# Patient Record
Sex: MALE | Race: WHITE | Employment: OTHER | URBAN - METROPOLITAN AREA
[De-identification: names, ages, dates, MRNs, and addresses within clinical notes are randomized per-mention and may not be internally consistent; named-entity substitution may affect disease eponyms.]

---

## 2017-03-16 ENCOUNTER — OFFICE VISIT (OUTPATIENT)
Dept: FAMILY MEDICINE CLINIC | Age: 62
End: 2017-03-16
Payer: COMMERCIAL

## 2017-03-16 VITALS
SYSTOLIC BLOOD PRESSURE: 142 MMHG | DIASTOLIC BLOOD PRESSURE: 86 MMHG | HEART RATE: 80 BPM | BODY MASS INDEX: 35.2 KG/M2 | HEIGHT: 66 IN | WEIGHT: 219 LBS

## 2017-03-16 DIAGNOSIS — Z12.5 SCREENING FOR PROSTATE CANCER: ICD-10-CM

## 2017-03-16 DIAGNOSIS — Z00.00 PHYSICAL EXAM, ANNUAL: Primary | ICD-10-CM

## 2017-03-16 DIAGNOSIS — R07.2 PRECORDIAL PAIN: ICD-10-CM

## 2017-03-16 DIAGNOSIS — M15.9 PRIMARY OSTEOARTHRITIS INVOLVING MULTIPLE JOINTS: ICD-10-CM

## 2017-03-16 DIAGNOSIS — Z13.220 SCREENING CHOLESTEROL LEVEL: ICD-10-CM

## 2017-03-16 DIAGNOSIS — Z12.11 SCREEN FOR COLON CANCER: ICD-10-CM

## 2017-03-16 DIAGNOSIS — I10 ESSENTIAL HYPERTENSION: ICD-10-CM

## 2017-03-16 LAB
HEMOCCULT STL QL: NORMAL

## 2017-03-16 PROCEDURE — 82270 OCCULT BLOOD FECES: CPT | Performed by: FAMILY MEDICINE

## 2017-03-16 PROCEDURE — 99396 PREV VISIT EST AGE 40-64: CPT | Performed by: FAMILY MEDICINE

## 2017-03-16 RX ORDER — LISINOPRIL AND HYDROCHLOROTHIAZIDE 12.5; 1 MG/1; MG/1
TABLET ORAL
COMMUNITY
Start: 2016-12-19 | End: 2017-03-16 | Stop reason: ALTCHOICE

## 2017-03-16 RX ORDER — CLOTRIMAZOLE AND BETAMETHASONE DIPROPIONATE 10; .64 MG/G; MG/G
CREAM TOPICAL
COMMUNITY
Start: 2017-03-15 | End: 2019-10-02 | Stop reason: ALTCHOICE

## 2017-03-16 RX ORDER — LISINOPRIL AND HYDROCHLOROTHIAZIDE 20; 12.5 MG/1; MG/1
1 TABLET ORAL DAILY
Qty: 90 TABLET | Refills: 3 | Status: SHIPPED | OUTPATIENT
Start: 2017-03-16 | End: 2018-04-02 | Stop reason: SDUPTHER

## 2017-03-16 ASSESSMENT — ENCOUNTER SYMPTOMS
CONSTIPATION: 0
HEARTBURN: 0
ABDOMINAL PAIN: 0
EYES NEGATIVE: 1
BLOOD IN STOOL: 0
DIARRHEA: 0
SHORTNESS OF BREATH: 0

## 2017-03-16 ASSESSMENT — PATIENT HEALTH QUESTIONNAIRE - PHQ9
SUM OF ALL RESPONSES TO PHQ9 QUESTIONS 1 & 2: 0
1. LITTLE INTEREST OR PLEASURE IN DOING THINGS: 0
2. FEELING DOWN, DEPRESSED OR HOPELESS: 0
SUM OF ALL RESPONSES TO PHQ QUESTIONS 1-9: 0

## 2017-04-01 ENCOUNTER — HOSPITAL ENCOUNTER (OUTPATIENT)
Age: 62
Discharge: HOME | End: 2017-04-01
Payer: COMMERCIAL

## 2017-04-01 DIAGNOSIS — Z12.5 SCREENING FOR PROSTATE CANCER: ICD-10-CM

## 2017-04-01 DIAGNOSIS — I10 ESSENTIAL HYPERTENSION: ICD-10-CM

## 2017-04-01 DIAGNOSIS — Z13.220 SCREENING CHOLESTEROL LEVEL: ICD-10-CM

## 2017-04-01 LAB
ALBUMIN SERPL-MCNC: 4.1 G/DL (ref 3.5–5.2)
ALBUMIN/GLOBULIN RATIO: 1.6 (ref 1–2.5)
ALP BLD-CCNC: 75 U/L (ref 40–129)
ALT SERPL-CCNC: 24 U/L (ref 5–41)
ANION GAP SERPL CALCULATED.3IONS-SCNC: 15 MMOL/L (ref 9–17)
AST SERPL-CCNC: 21 U/L
BILIRUB SERPL-MCNC: 0.76 MG/DL (ref 0.3–1.2)
BUN BLDV-MCNC: 13 MG/DL (ref 8–23)
BUN/CREAT BLD: ABNORMAL (ref 9–20)
CALCIUM SERPL-MCNC: 8.8 MG/DL (ref 8.6–10.4)
CHLORIDE BLD-SCNC: 103 MMOL/L (ref 98–107)
CHOLESTEROL/HDL RATIO: 5.6
CHOLESTEROL: 167 MG/DL
CO2: 25 MMOL/L (ref 20–31)
CREAT SERPL-MCNC: 0.93 MG/DL (ref 0.7–1.2)
GFR AFRICAN AMERICAN: >60 ML/MIN
GFR NON-AFRICAN AMERICAN: >60 ML/MIN
GFR SERPL CREATININE-BSD FRML MDRD: ABNORMAL ML/MIN/{1.73_M2}
GFR SERPL CREATININE-BSD FRML MDRD: ABNORMAL ML/MIN/{1.73_M2}
GLUCOSE BLD-MCNC: 108 MG/DL (ref 70–99)
HDLC SERPL-MCNC: 30 MG/DL
LDL CHOLESTEROL: 105 MG/DL (ref 0–130)
POTASSIUM SERPL-SCNC: 3.9 MMOL/L (ref 3.7–5.3)
PROSTATE SPECIFIC ANTIGEN: 1.71 UG/L
SODIUM BLD-SCNC: 143 MMOL/L (ref 135–144)
TOTAL PROTEIN: 6.7 G/DL (ref 6.4–8.3)
TRIGL SERPL-MCNC: 162 MG/DL
VLDLC SERPL CALC-MCNC: ABNORMAL MG/DL (ref 1–30)

## 2017-04-01 PROCEDURE — G0103 PSA SCREENING: HCPCS

## 2017-04-01 PROCEDURE — 80053 COMPREHEN METABOLIC PANEL: CPT

## 2017-04-01 PROCEDURE — 80061 LIPID PANEL: CPT

## 2017-04-01 PROCEDURE — 36415 COLL VENOUS BLD VENIPUNCTURE: CPT

## 2017-04-06 ENCOUNTER — HOSPITAL ENCOUNTER (OUTPATIENT)
Dept: NON INVASIVE DIAGNOSTICS | Age: 62
Discharge: HOME | End: 2017-04-06
Payer: COMMERCIAL

## 2017-04-06 VITALS — HEART RATE: 62 BPM | DIASTOLIC BLOOD PRESSURE: 71 MMHG | RESPIRATION RATE: 15 BRPM | SYSTOLIC BLOOD PRESSURE: 108 MMHG

## 2017-04-06 DIAGNOSIS — I10 ESSENTIAL HYPERTENSION: ICD-10-CM

## 2017-04-06 DIAGNOSIS — R07.2 PRECORDIAL PAIN: ICD-10-CM

## 2017-04-06 PROCEDURE — 93017 CV STRESS TEST TRACING ONLY: CPT

## 2017-04-07 ENCOUNTER — OFFICE VISIT (OUTPATIENT)
Dept: FAMILY MEDICINE CLINIC | Age: 62
End: 2017-04-07
Payer: COMMERCIAL

## 2017-04-07 VITALS
WEIGHT: 225 LBS | HEIGHT: 69 IN | HEART RATE: 88 BPM | DIASTOLIC BLOOD PRESSURE: 78 MMHG | SYSTOLIC BLOOD PRESSURE: 140 MMHG | BODY MASS INDEX: 33.33 KG/M2

## 2017-04-07 DIAGNOSIS — R94.31 ABNORMAL ECG DURING EXERCISE STRESS TEST: ICD-10-CM

## 2017-04-07 DIAGNOSIS — R73.09 ABNORMAL GLUCOSE: ICD-10-CM

## 2017-04-07 DIAGNOSIS — R07.2 PRECORDIAL PAIN: Primary | ICD-10-CM

## 2017-04-07 DIAGNOSIS — I10 ESSENTIAL HYPERTENSION: ICD-10-CM

## 2017-04-07 PROCEDURE — 99213 OFFICE O/P EST LOW 20 MIN: CPT | Performed by: FAMILY MEDICINE

## 2017-04-07 RX ORDER — ASPIRIN 81 MG/1
81 TABLET ORAL DAILY
Qty: 30 TABLET | Refills: 3 | COMMUNITY
Start: 2017-04-07 | End: 2019-10-02 | Stop reason: ALTCHOICE

## 2017-04-07 ASSESSMENT — ENCOUNTER SYMPTOMS
ABDOMINAL PAIN: 0
COUGH: 0
SHORTNESS OF BREATH: 0
EYES NEGATIVE: 1

## 2017-10-09 ENCOUNTER — OFFICE VISIT (OUTPATIENT)
Dept: FAMILY MEDICINE CLINIC | Age: 62
End: 2017-10-09
Payer: COMMERCIAL

## 2017-10-09 VITALS
WEIGHT: 214 LBS | HEART RATE: 72 BPM | BODY MASS INDEX: 31.7 KG/M2 | HEIGHT: 69 IN | SYSTOLIC BLOOD PRESSURE: 120 MMHG | DIASTOLIC BLOOD PRESSURE: 78 MMHG

## 2017-10-09 DIAGNOSIS — M15.9 PRIMARY OSTEOARTHRITIS INVOLVING MULTIPLE JOINTS: ICD-10-CM

## 2017-10-09 DIAGNOSIS — I10 ESSENTIAL HYPERTENSION: Primary | ICD-10-CM

## 2017-10-09 DIAGNOSIS — G89.29 CHRONIC PAIN OF BOTH SHOULDERS: ICD-10-CM

## 2017-10-09 DIAGNOSIS — E66.09 NON MORBID OBESITY DUE TO EXCESS CALORIES: ICD-10-CM

## 2017-10-09 DIAGNOSIS — M25.512 CHRONIC PAIN OF BOTH SHOULDERS: ICD-10-CM

## 2017-10-09 DIAGNOSIS — M25.511 CHRONIC PAIN OF BOTH SHOULDERS: ICD-10-CM

## 2017-10-09 PROCEDURE — 99213 OFFICE O/P EST LOW 20 MIN: CPT | Performed by: FAMILY MEDICINE

## 2017-10-09 ASSESSMENT — ENCOUNTER SYMPTOMS
SHORTNESS OF BREATH: 0
COUGH: 0
ABDOMINAL PAIN: 0
EYES NEGATIVE: 1

## 2017-10-09 NOTE — PROGRESS NOTES
Social History     Social History    Marital status:      Spouse name: N/A    Number of children: N/A    Years of education: N/A     Social History Main Topics    Smoking status: Never Smoker    Smokeless tobacco: Never Used    Alcohol use No      Comment: sober since 1987    Drug use: No    Sexual activity: Yes     Partners: Female     Other Topics Concern    None     Social History Narrative    None       SURGICAL HISTORY    Past Surgical History:   Procedure Laterality Date    KNEE ARTHROSCOPY Left        CURRENT MEDICATIONS    Current Outpatient Prescriptions   Medication Sig Dispense Refill    VITAMIN E PO Take by mouth      MAGNESIUM PO Take by mouth      Misc. Devices MISC Patient request tubing, mask and filters appropriate for his CPAP machine 1 each 0    aspirin EC 81 MG EC tablet Take 1 tablet by mouth daily 30 tablet 3    lisinopril-hydrochlorothiazide (PRINZIDE;ZESTORETIC) 20-12.5 MG per tablet Take 1 tablet by mouth daily 90 tablet 3    clotrimazole-betamethasone (LOTRISONE) 1-0.05 % cream        No current facility-administered medications for this visit. ALLERGIES    No Known Allergies    PHYSICAL EXAM   Physical Exam   Constitutional: He is oriented to person, place, and time. He appears well-developed and well-nourished. HENT:   Head: Normocephalic. Mouth/Throat: Oropharynx is clear and moist.   Eyes: Pupils are equal, round, and reactive to light. Neck: Normal range of motion. Neck supple. No thyromegaly present. Cardiovascular: Normal rate, regular rhythm and normal heart sounds. No murmur heard. Pulmonary/Chest: Effort normal and breath sounds normal. He has no wheezes. He has no rales. Abdominal: Soft. There is no tenderness. There is no rebound and no guarding. Musculoskeletal: Normal range of motion. He exhibits tenderness (bilater distal shoulders) and deformity (OA changes). He exhibits no edema.    Crepitus bilat shoulders   Lymphadenopathy: He has no cervical adenopathy. Neurological: He is alert and oriented to person, place, and time. Skin: Skin is warm and dry. Psychiatric: He has a normal mood and affect. His behavior is normal.   Vitals reviewed. Assessment/PLan  1. Essential hypertension  Chronic, stable on med. Cont active lifestyle    2. Primary osteoarthritis involving multiple joints  Chronic. Cont to see ortho. 3. Chronic pain of both shoulders  Chronic. See ortho if wanted. 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 4. Non morbid obesity due to excess calories  Chronic, losing weight with improved diet. Stu Palacio received counseling on the following healthy behaviors: exercise and medication adherence  Reviewed prior labs and health maintenance  Continue current medications, diet and exercise. Discussed use, benefit, and side effects of prescribed medications. Barriers to medication compliance addressed. Patient given educational materials - see patient instructions  Was a self-tracking handout given in paper form or via Needlet? Yes    Requested Prescriptions      No prescriptions requested or ordered in this encounter       All patient questions answered. Patient voiced understanding. Quality Measures    Body mass index is 31.6 kg/m². Elevated. Weight control planned discussed Healthy diet and regular exercise. BP: 120/78 Blood pressure is normal. Treatment plan consists of No treatment change needed. Lab Results   Component Value Date    LDLCHOLESTEROL 105 04/01/2017    (goal LDL reduction with dx if diabetes is 50% LDL reduction)      PHQ Scores 3/16/2017   PHQ2 Score 0   PHQ9 Score 0     Interpretation of Total Score Depression Severity: 1-4 = Minimal depression, 5-9 = Mild depression, 10-14 = Moderate depression, 15-19 = Moderately severe depression, 20-27 = Severe depression     Return in about 6 months (around 4/9/2018) for Return for bp check, Return for labs.         Electronically signed by Gume Rodríguez MD on 10/9/17 at 3:51 PM

## 2018-04-02 ENCOUNTER — OFFICE VISIT (OUTPATIENT)
Dept: FAMILY MEDICINE CLINIC | Age: 63
End: 2018-04-02
Payer: COMMERCIAL

## 2018-04-02 VITALS
SYSTOLIC BLOOD PRESSURE: 138 MMHG | WEIGHT: 223.65 LBS | HEART RATE: 64 BPM | BODY MASS INDEX: 33.03 KG/M2 | DIASTOLIC BLOOD PRESSURE: 78 MMHG

## 2018-04-02 DIAGNOSIS — Z13.220 SCREENING CHOLESTEROL LEVEL: ICD-10-CM

## 2018-04-02 DIAGNOSIS — Z00.00 PHYSICAL EXAM, ANNUAL: Primary | ICD-10-CM

## 2018-04-02 DIAGNOSIS — L30.9 CHRONIC DERMATITIS: ICD-10-CM

## 2018-04-02 DIAGNOSIS — Z01.818 PRE-OPERATIVE CLEARANCE: ICD-10-CM

## 2018-04-02 DIAGNOSIS — I10 ESSENTIAL HYPERTENSION: ICD-10-CM

## 2018-04-02 DIAGNOSIS — Z12.11 SCREEN FOR COLON CANCER: ICD-10-CM

## 2018-04-02 DIAGNOSIS — M15.9 PRIMARY OSTEOARTHRITIS INVOLVING MULTIPLE JOINTS: ICD-10-CM

## 2018-04-02 DIAGNOSIS — Z12.5 SCREENING FOR PROSTATE CANCER: ICD-10-CM

## 2018-04-02 DIAGNOSIS — G47.33 OBSTRUCTIVE SLEEP APNEA SYNDROME: ICD-10-CM

## 2018-04-02 LAB
HEMOCCULT STL QL: NEGATIVE
HEMOCCULT STL QL: NORMAL
HEMOCCULT STL QL: NORMAL

## 2018-04-02 PROCEDURE — 99396 PREV VISIT EST AGE 40-64: CPT | Performed by: FAMILY MEDICINE

## 2018-04-02 PROCEDURE — 82270 OCCULT BLOOD FECES: CPT | Performed by: FAMILY MEDICINE

## 2018-04-02 RX ORDER — LISINOPRIL AND HYDROCHLOROTHIAZIDE 20; 12.5 MG/1; MG/1
1 TABLET ORAL DAILY
Qty: 90 TABLET | Refills: 3 | Status: SHIPPED | OUTPATIENT
Start: 2018-04-02 | End: 2019-09-17 | Stop reason: SDUPTHER

## 2018-04-02 ASSESSMENT — PATIENT HEALTH QUESTIONNAIRE - PHQ9
1. LITTLE INTEREST OR PLEASURE IN DOING THINGS: 0
2. FEELING DOWN, DEPRESSED OR HOPELESS: 0
SUM OF ALL RESPONSES TO PHQ9 QUESTIONS 1 & 2: 0
SUM OF ALL RESPONSES TO PHQ QUESTIONS 1-9: 0

## 2018-04-02 ASSESSMENT — ENCOUNTER SYMPTOMS
DIARRHEA: 0
HEARTBURN: 0
BLOOD IN STOOL: 0
ABDOMINAL PAIN: 0
CONSTIPATION: 0
COUGH: 0
EYES NEGATIVE: 1
SHORTNESS OF BREATH: 0

## 2018-04-03 LAB
ALBUMIN SERPL-MCNC: 3.8 G/DL
ALP BLD-CCNC: 75 U/L
ALT SERPL-CCNC: 24 U/L
ANION GAP SERPL CALCULATED.3IONS-SCNC: 10 MMOL/L
AST SERPL-CCNC: 22 U/L
BILIRUB SERPL-MCNC: 0.5 MG/DL (ref 0.1–1.4)
BUN BLDV-MCNC: 15 MG/DL
CALCIUM SERPL-MCNC: 9 MG/DL
CHLORIDE BLD-SCNC: 107 MMOL/L
CHOLESTEROL, TOTAL: 174 MG/DL
CHOLESTEROL/HDL RATIO: 5.6
CO2: 27 MMOL/L
CREAT SERPL-MCNC: 1.06 MG/DL
GFR CALCULATED: NORMAL
GLUCOSE BLD-MCNC: 107 MG/DL
HDLC SERPL-MCNC: 31 MG/DL (ref 35–70)
LDL CHOLESTEROL CALCULATED: 122 MG/DL (ref 0–160)
POTASSIUM SERPL-SCNC: 3.7 MMOL/L
PROSTATE SPECIFIC ANTIGEN: 1.43 NG/ML
SODIUM BLD-SCNC: 144 MMOL/L
TOTAL PROTEIN: 6.1
TRIGL SERPL-MCNC: 106 MG/DL
VLDLC SERPL CALC-MCNC: 21 MG/DL

## 2018-04-05 DIAGNOSIS — Z13.220 SCREENING CHOLESTEROL LEVEL: ICD-10-CM

## 2018-04-05 DIAGNOSIS — I10 ESSENTIAL HYPERTENSION: ICD-10-CM

## 2018-04-05 DIAGNOSIS — Z12.5 SCREENING FOR PROSTATE CANCER: ICD-10-CM

## 2018-04-06 ENCOUNTER — TELEPHONE (OUTPATIENT)
Dept: FAMILY MEDICINE CLINIC | Age: 63
End: 2018-04-06

## 2018-04-24 ENCOUNTER — HOSPITAL ENCOUNTER (OUTPATIENT)
Facility: CLINIC | Age: 63
Discharge: HOME | End: 2018-04-24
Payer: COMMERCIAL

## 2018-04-24 LAB — FORWARDED TO:: NORMAL

## 2018-04-24 PROCEDURE — 36415 COLL VENOUS BLD VENIPUNCTURE: CPT

## 2018-05-07 ENCOUNTER — OFFICE VISIT (OUTPATIENT)
Dept: FAMILY MEDICINE CLINIC | Age: 63
End: 2018-05-07
Payer: COMMERCIAL

## 2018-05-07 VITALS
DIASTOLIC BLOOD PRESSURE: 78 MMHG | BODY MASS INDEX: 32.34 KG/M2 | HEART RATE: 68 BPM | SYSTOLIC BLOOD PRESSURE: 122 MMHG | WEIGHT: 219 LBS

## 2018-05-07 DIAGNOSIS — G47.33 OBSTRUCTIVE SLEEP APNEA SYNDROME: ICD-10-CM

## 2018-05-07 DIAGNOSIS — I10 ESSENTIAL HYPERTENSION: Primary | ICD-10-CM

## 2018-05-07 DIAGNOSIS — M15.9 PRIMARY OSTEOARTHRITIS INVOLVING MULTIPLE JOINTS: ICD-10-CM

## 2018-05-07 PROCEDURE — 99213 OFFICE O/P EST LOW 20 MIN: CPT | Performed by: FAMILY MEDICINE

## 2018-05-07 RX ORDER — CELECOXIB 200 MG/1
CAPSULE ORAL
COMMUNITY
Start: 2018-04-14 | End: 2019-10-02 | Stop reason: ALTCHOICE

## 2018-05-07 RX ORDER — OXYCODONE HYDROCHLORIDE AND ACETAMINOPHEN 5; 325 MG/1; MG/1
TABLET ORAL
COMMUNITY
Start: 2018-04-25 | End: 2018-10-09 | Stop reason: ALTCHOICE

## 2018-05-07 ASSESSMENT — ENCOUNTER SYMPTOMS
SHORTNESS OF BREATH: 0
EYES NEGATIVE: 1
ABDOMINAL PAIN: 0
COUGH: 0

## 2018-05-31 ENCOUNTER — TELEPHONE (OUTPATIENT)
Dept: FAMILY MEDICINE CLINIC | Age: 63
End: 2018-05-31

## 2018-06-19 ENCOUNTER — OFFICE VISIT (OUTPATIENT)
Dept: FAMILY MEDICINE CLINIC | Age: 63
End: 2018-06-19
Payer: COMMERCIAL

## 2018-06-19 VITALS
SYSTOLIC BLOOD PRESSURE: 120 MMHG | DIASTOLIC BLOOD PRESSURE: 80 MMHG | WEIGHT: 217 LBS | BODY MASS INDEX: 32.14 KG/M2 | HEART RATE: 80 BPM | HEIGHT: 69 IN

## 2018-06-19 DIAGNOSIS — L60.0 INGROWN LEFT BIG TOENAIL: Primary | ICD-10-CM

## 2018-06-19 DIAGNOSIS — I10 ESSENTIAL HYPERTENSION: ICD-10-CM

## 2018-06-19 DIAGNOSIS — M15.9 PRIMARY OSTEOARTHRITIS INVOLVING MULTIPLE JOINTS: ICD-10-CM

## 2018-06-19 PROCEDURE — 99212 OFFICE O/P EST SF 10 MIN: CPT | Performed by: FAMILY MEDICINE

## 2018-10-09 ENCOUNTER — OFFICE VISIT (OUTPATIENT)
Dept: PULMONOLOGY | Age: 63
End: 2018-10-09
Payer: COMMERCIAL

## 2018-10-09 VITALS
DIASTOLIC BLOOD PRESSURE: 89 MMHG | OXYGEN SATURATION: 94 % | SYSTOLIC BLOOD PRESSURE: 139 MMHG | BODY MASS INDEX: 33.62 KG/M2 | WEIGHT: 227 LBS | HEIGHT: 69 IN | RESPIRATION RATE: 16 BRPM | HEART RATE: 75 BPM

## 2018-10-09 DIAGNOSIS — I10 ESSENTIAL HYPERTENSION: ICD-10-CM

## 2018-10-09 DIAGNOSIS — G47.33 OSA ON CPAP: ICD-10-CM

## 2018-10-09 DIAGNOSIS — E66.01 CLASS 3 SEVERE OBESITY DUE TO EXCESS CALORIES WITH SERIOUS COMORBIDITY IN ADULT, UNSPECIFIED BMI (HCC): Primary | ICD-10-CM

## 2018-10-09 DIAGNOSIS — Z99.89 OSA ON CPAP: ICD-10-CM

## 2018-10-09 PROCEDURE — 99204 OFFICE O/P NEW MOD 45 MIN: CPT | Performed by: INTERNAL MEDICINE

## 2018-10-09 RX ORDER — CYCLOBENZAPRINE HCL 10 MG
TABLET ORAL
COMMUNITY
Start: 2018-09-12 | End: 2019-10-02 | Stop reason: ALTCHOICE

## 2018-10-09 RX ORDER — CEPHALEXIN 500 MG/1
CAPSULE ORAL
COMMUNITY
Start: 2018-08-27 | End: 2018-11-13 | Stop reason: CLARIF

## 2018-10-09 ASSESSMENT — SLEEP AND FATIGUE QUESTIONNAIRES
HOW LIKELY ARE YOU TO NOD OFF OR FALL ASLEEP WHILE SITTING AND TALKING TO SOMEONE: 0
HOW LIKELY ARE YOU TO NOD OFF OR FALL ASLEEP WHILE WATCHING TV: 3
HOW LIKELY ARE YOU TO NOD OFF OR FALL ASLEEP WHILE SITTING INACTIVE IN A PUBLIC PLACE: 0
HOW LIKELY ARE YOU TO NOD OFF OR FALL ASLEEP WHILE SITTING QUIETLY AFTER LUNCH WITHOUT ALCOHOL: 2
ESS TOTAL SCORE: 14
HOW LIKELY ARE YOU TO NOD OFF OR FALL ASLEEP WHILE LYING DOWN TO REST IN THE AFTERNOON WHEN CIRCUMSTANCES PERMIT: 3
HOW LIKELY ARE YOU TO NOD OFF OR FALL ASLEEP WHILE SITTING AND READING: 3
HOW LIKELY ARE YOU TO NOD OFF OR FALL ASLEEP WHEN YOU ARE A PASSENGER IN A CAR FOR AN HOUR WITHOUT A BREAK: 3
HOW LIKELY ARE YOU TO NOD OFF OR FALL ASLEEP IN A CAR, WHILE STOPPED FOR A FEW MINUTES IN TRAFFIC: 0

## 2018-10-09 NOTE — PROGRESS NOTES
input(s): WBC, HGB, PLT in the last 72 hours. BMP:  No results for input(s): NA, K, CL, CO2, BUN, CREATININE, GLUCOSE in the last 72 hours. Hepatic: No results for input(s): AST, ALT, ALB, BILITOT, ALKPHOS in the last 72 hours. Amylase: No results found for: AMYLASE  Lipase: No results found for: LIPASE  Troponin: No results for input(s): TROPONINI in the last 72 hours. BNP: No results for input(s): BNP in the last 72 hours. Lipids: No results for input(s): CHOL, HDL in the last 72 hours. Invalid input(s): LDLCALCU  ABGs: No results found for: PHART, PO2ART, VIO9NFH  INR: No results for input(s): INR in the last 72 hours. Thyroid: No results found for: TSH  Urinalysis: No results for input(s): BACTERIA, BLOODU, CLARITYU, COLORU, PHUR, PROTEINU, RBCUA, SPECGRAV, BILIRUBINUR, NITRU, WBCUA, LEUKOCYTESUR, GLUCOSEU in the last 72 hours. IMPRESSION:    Visit Diagnoses       Codes    Class 3 severe obesity due to excess calories with serious comorbidity in adult, unspecified [facility] ([facility]    -  Primary ICD-10-CM: E66.01  ICD-9-CM: 278.01    LEISA on CPAP     ICD-10-CM: G47.33, Z99.89  ICD-9-CM: 327.23, V46.8        :                PLAN:       Patient has significant degree of obstructive sleep apnea syndrome that has been responding well to the use of CPAP. Unfortunately, CPAP machine is not working lately, so he is experiencing numbness affects of sleep apnea. Again, which is interfering with his daytime activities and also interfering with her nighttime sleep. Patient has systemic hypertension which can further deteriorate for him not using the CPAP machine. He morbidly obese. He was advised to lose weight. Considering the previous sleep studies and the effectiveness of the CPAP machine. I advised the patient that he should have a repeat and new CPAP machine. The CPAP machine and machine to be set at a pressure of 11 cm of water, which is already set of the previous machine.   If this pressure is not adequate, then we'll take him back in to the sleep center for re-titration. Patient was encouraged to lose weight. Weight reduction will also help the apnea and improving his daytime symptoms and blood pressure. Patient advised to use the heated humidifier along the CPAP machine to prevent upper airway dryness. Medications Discontinued During This Encounter   Medication Reason    MAGNESIUM PO Therapy completed    oxyCODONE-acetaminophen (PERCOCET) 5-325 MG per tablet Therapy completed    VITAMIN E PO Therapy completed       Lulu Cisneros received counseling on the following healthy behaviors: nutrition, exercise and medication adherence    Patient given educational materials : see patient instruction       Discussed use, benefit, and side effects of prescribed medications. Barriers to medication compliance addressed. All patient questions answered. Pt voiced understanding. I hope this updates you on my evaluation and clinical thinking. Thank you for allowing me to participate in his care. Sincerely,      Electronically signed by Migel Khan MD on   10/9/18 at 3:44 PM       Please note that this chart was generated using voice recognition Dragon dictation software. Although every effort was made to ensure the accuracy of this automated transcription, some errors in transcription may have occurred.

## 2018-11-08 ENCOUNTER — OFFICE VISIT (OUTPATIENT)
Dept: PULMONOLOGY | Age: 63
End: 2018-11-08
Payer: COMMERCIAL

## 2018-11-08 VITALS
OXYGEN SATURATION: 96 % | HEIGHT: 69 IN | WEIGHT: 226 LBS | HEART RATE: 65 BPM | DIASTOLIC BLOOD PRESSURE: 78 MMHG | RESPIRATION RATE: 14 BRPM | SYSTOLIC BLOOD PRESSURE: 142 MMHG | BODY MASS INDEX: 33.47 KG/M2

## 2018-11-08 DIAGNOSIS — Z99.89 OSA ON CPAP: Primary | ICD-10-CM

## 2018-11-08 DIAGNOSIS — G47.33 OSA ON CPAP: Primary | ICD-10-CM

## 2018-11-08 PROCEDURE — 99214 OFFICE O/P EST MOD 30 MIN: CPT | Performed by: INTERNAL MEDICINE

## 2018-11-08 NOTE — PROGRESS NOTES
 celecoxib (CELEBREX) 200 MG capsule       lisinopril-hydrochlorothiazide (PRINZIDE;ZESTORETIC) 20-12.5 MG per tablet Take 1 tablet by mouth daily 90 tablet 3    aspirin EC 81 MG EC tablet Take 1 tablet by mouth daily 30 tablet 3    clotrimazole-betamethasone (LOTRISONE) 1-0.05 % cream        No facility-administered encounter medications on file as of 11/8/2018. Social History:   TOBACCO:   reports that he has never smoked. He has never used smokeless tobacco.  ETOH:   reports that he does not drink alcohol. DRUG:  reports that he does not use drugs.    Immunizations:    Immunization History   Administered Date(s) Administered    Tdap (Boostrix, Adacel) 04/12/2010      Review of Systems:    General: positive for  - fatigue and sleep disturbance  Psychological: negative  ENT: negative  Hematological and Lymphatic: negative  Endocrine: negative  Respiratory: no cough, shortness of breath, or wheezing  Cardiovascular: no chest pain or dyspnea on exertion  Gastrointestinal: no abdominal pain, change in bowel habits, or black or bloody stools  Genitourinary: no dysuria, trouble voiding, or hematuria  Musculoskeletal: negative    LUNG CANCER SCREENING:   CRITERIA MET FOR LUNG CANCER SCREENING           No        LOW DOSE CT CHEST  Ordered                        []   Deferred for a later date                        []   Refused                        []     REASON(S)  CRITERIA NOT MET FOR LUNG CANCER SCREENING:  Smoking < 30 pack-years                        [x]   Age is < 54 or > 77 years                        []   Quit smoking 15 years or greater                        []   Recent CT within 11 months                        []   Life Expectancy <5 years                        []   Signs or symptoms of Lung cancer                        []     PHYSICAL EXAM     VITAL SIGNS:   BP (!) 142/78 (Site: Left Upper Arm, Position: Sitting, Cuff Size: Medium Adult)   Pulse 65   Resp 14   Ht 5' 9\" (1.753 m)   Wt 226 Coagulation Profile:   No results found for: INR, PROTIME, APTT  Cardiac Enzymes:  No results found for: CKTOTAL, CKMB, CKMBINDEX, TROPONINI  Lactic Acid:  No results found for: LACTA  BNP:   No results found for: BNP  D-Dimer:  No results found for: DDIMER  Others labs:  No results found for: TSH, W7EQMKT, O6JFUUL, THYROIDAB, FT3, T4FREE  No results found for: LABURIC, URICACID  No results found for: TARIK, RHEUMFACTOR, SEDRATE, CRP  No results found for: IRON, TIBC, FERRITIN  No results found for: SPEP, UPEP  Lab Results   Component Value Date    PSA 1.43 04/03/2018     Radiological reports:  CXR:    CT Scans:    Pulmonary Function Test:  No results found for: FEV1, FVC, OCB6MUH, TLC, DLCO       Echocardiogram:    Stress Test/Cardiac Angiography:    Polysomnography:   Baseline 2/8/11 AHI 66.3  Titration 2/11/11 CPAP at 11 cm H2O recommended    ASSESSMENT AND PLAN     Assessment:  1. LEISA on CPAP      Plan/Recommendations:  Baseline polysomnograpm and titration study were reviewed. Patient's CPAP machine recently broke and was prescribed new machine at last visit  He continues to complain of excessive daytime sleepiness  Will order a new titration study as last study was done in 2011  We'll see the patient back after the sleep study. Weight loss was recommended and discussed. Recommended following good sleep hygiene and instructions provided. CPAP/BiPAP to be used for at least 4 hours every night. Use humidifier if needed. Explained importance of compliance with treatment of sleep apnea. Pt is not to drive if sleepy. Influenza Vaccine: Recommend flu vaccination in the fall annually. Up-to-date  Pneumococcal Vaccine: Recommendations given regarding pneumococcal vaccinations. Up-to-date  Lung cancer Screening: After reviewing the patient's smoking history and age patient does not meet the criteria for lung cancer screening.   Patient received counseling on the following healthy behaviors: nutrition, exercise

## 2018-11-13 ENCOUNTER — OFFICE VISIT (OUTPATIENT)
Dept: FAMILY MEDICINE CLINIC | Age: 63
End: 2018-11-13
Payer: COMMERCIAL

## 2018-11-13 VITALS
WEIGHT: 228 LBS | DIASTOLIC BLOOD PRESSURE: 72 MMHG | SYSTOLIC BLOOD PRESSURE: 130 MMHG | BODY MASS INDEX: 33.67 KG/M2 | HEART RATE: 72 BPM

## 2018-11-13 DIAGNOSIS — M15.9 PRIMARY OSTEOARTHRITIS INVOLVING MULTIPLE JOINTS: ICD-10-CM

## 2018-11-13 DIAGNOSIS — I10 ESSENTIAL HYPERTENSION: Primary | ICD-10-CM

## 2018-11-13 PROCEDURE — 99214 OFFICE O/P EST MOD 30 MIN: CPT | Performed by: FAMILY MEDICINE

## 2018-11-13 ASSESSMENT — ENCOUNTER SYMPTOMS
BLOOD IN STOOL: 0
ABDOMINAL PAIN: 0
COUGH: 0
SHORTNESS OF BREATH: 0
EYES NEGATIVE: 1

## 2018-11-13 NOTE — PROGRESS NOTES
______________________________________________________________________  Medical History Review  Past Medical, Family, and Social History reviewed and does contribute to the patient presenting condition    There are no preventive care reminders to display for this patient.
behavior is normal.   Vitals reviewed. Assessment/PLan  1. Essential hypertension  Chronic, stable. Cont meds and active lifestyle. Labs up to date. 2. Primary osteoarthritis involving multiple joints  Cont to stay active. Follow up with ortho as needed. Bebe Avalos received counseling on the following healthy behaviors: nutrition, exercise and medication adherence  Reviewed prior labs and health maintenance. Continue current medications, diet and exercise. Discussed use, benefit, and side effects of prescribed medications. Barriers to medication compliance addressed. Patient given educational materials - see patient instructions. All patient questions answered. Patient voiced understanding. Return in about 6 months (around 5/13/2019) for htn.         Electronically signed by Ismael Wiley MD on 11/13/18 at 8:15 AM

## 2018-12-06 ENCOUNTER — HOSPITAL ENCOUNTER (OUTPATIENT)
Dept: SLEEP CENTER | Age: 63
Discharge: HOME | End: 2018-12-08
Payer: COMMERCIAL

## 2018-12-06 VITALS — BODY MASS INDEX: 32.58 KG/M2 | WEIGHT: 220 LBS | HEIGHT: 69 IN

## 2018-12-06 DIAGNOSIS — Z99.89 OSA ON CPAP: ICD-10-CM

## 2018-12-06 DIAGNOSIS — G47.33 OSA ON CPAP: ICD-10-CM

## 2018-12-06 PROCEDURE — 95811 POLYSOM 6/>YRS CPAP 4/> PARM: CPT

## 2018-12-13 ENCOUNTER — OFFICE VISIT (OUTPATIENT)
Dept: PULMONOLOGY | Age: 63
End: 2018-12-13
Payer: COMMERCIAL

## 2018-12-13 VITALS
HEART RATE: 79 BPM | DIASTOLIC BLOOD PRESSURE: 78 MMHG | WEIGHT: 224 LBS | RESPIRATION RATE: 14 BRPM | BODY MASS INDEX: 33.18 KG/M2 | HEIGHT: 69 IN | OXYGEN SATURATION: 98 % | SYSTOLIC BLOOD PRESSURE: 131 MMHG

## 2018-12-13 DIAGNOSIS — G47.33 OSA ON CPAP: Primary | ICD-10-CM

## 2018-12-13 DIAGNOSIS — Z99.89 OSA ON CPAP: Primary | ICD-10-CM

## 2018-12-13 DIAGNOSIS — F51.09 SITUATIONAL INSOMNIA: ICD-10-CM

## 2018-12-13 DIAGNOSIS — I10 ESSENTIAL HYPERTENSION: ICD-10-CM

## 2018-12-13 PROCEDURE — 99213 OFFICE O/P EST LOW 20 MIN: CPT | Performed by: INTERNAL MEDICINE

## 2018-12-13 ASSESSMENT — ENCOUNTER SYMPTOMS
EYES NEGATIVE: 1
RESPIRATORY NEGATIVE: 1

## 2018-12-13 NOTE — PROGRESS NOTES
Subjective:      Patient ID: Geremias Smlalwood is a 61 y.o. male. HPI  Follow-up for sleep apnea. Return titration study done last week. Titrated from 11-13 cm water pressure on CPAP. Preliminary results. Patient's machine is 9years old. Not working well. Wishes new machine. Likes the mask that he was fitted with at the sleep disorder Center. Patient is very compliant with CPAP. Uses every night for the entire night. Previous compliance download showed 89% compliance for an average of over 7 hours. Virtually no residual apneas. Feels that he's benefiting greatly by using CPAP. Patient states that recently he started waking up about 4 AM.  Normally goes to bed between 9:30 and 10. Reports a great deal of stress and anxiety in his life. Wife had a stroke. Mother recently . States that he wakes up in his mind races. Review of Systems   Constitutional: Negative. HENT: Negative. Eyes: Negative. Respiratory: Negative. Cardiovascular: Negative. Psychiatric/Behavioral: Positive for sleep disturbance. The patient is nervous/anxious. All other systems reviewed and are negative. Objective:     Physical Exam   Constitutional: He is oriented to person, place, and time. He appears well-developed and well-nourished. HENT:   Nose: Nose normal.   Large tongue , low hanging soft palate and large uvula. Overall pharyngeal orifice moderately decreased     Eyes: Conjunctivae are normal. No scleral icterus. Neck: Neck supple. No JVD present. No tracheal deviation present. No thyromegaly present. Cardiovascular: Normal rate, regular rhythm and normal heart sounds. Exam reveals no gallop. No murmur heard. Pulmonary/Chest: Effort normal. No respiratory distress. He has no wheezes. He has no rales. He exhibits no tenderness. Abdominal: Soft. There is no tenderness. Musculoskeletal: He exhibits no edema. Lymphadenopathy:     He has no cervical adenopathy.    Neurological:

## 2019-01-15 LAB — STATUS: NORMAL

## 2019-01-21 ENCOUNTER — OFFICE VISIT (OUTPATIENT)
Dept: FAMILY MEDICINE CLINIC | Age: 64
End: 2019-01-21
Payer: COMMERCIAL

## 2019-01-21 VITALS
HEART RATE: 56 BPM | SYSTOLIC BLOOD PRESSURE: 134 MMHG | DIASTOLIC BLOOD PRESSURE: 88 MMHG | BODY MASS INDEX: 34.26 KG/M2 | WEIGHT: 232 LBS

## 2019-01-21 DIAGNOSIS — H61.23 BILATERAL IMPACTED CERUMEN: Primary | ICD-10-CM

## 2019-01-21 PROCEDURE — 99212 OFFICE O/P EST SF 10 MIN: CPT | Performed by: FAMILY MEDICINE

## 2019-02-04 ENCOUNTER — OFFICE VISIT (OUTPATIENT)
Dept: FAMILY MEDICINE CLINIC | Age: 64
End: 2019-02-04
Payer: COMMERCIAL

## 2019-02-04 VITALS
SYSTOLIC BLOOD PRESSURE: 134 MMHG | DIASTOLIC BLOOD PRESSURE: 84 MMHG | WEIGHT: 231 LBS | HEART RATE: 68 BPM | BODY MASS INDEX: 34.11 KG/M2

## 2019-02-04 DIAGNOSIS — H61.23 BILATERAL IMPACTED CERUMEN: Primary | ICD-10-CM

## 2019-02-04 PROCEDURE — 99212 OFFICE O/P EST SF 10 MIN: CPT | Performed by: FAMILY MEDICINE

## 2019-02-11 ENCOUNTER — TELEPHONE (OUTPATIENT)
Dept: OTHER | Age: 64
End: 2019-02-11

## 2019-02-11 DIAGNOSIS — R05.9 COUGH WITH FEVER: Primary | ICD-10-CM

## 2019-02-11 DIAGNOSIS — R50.9 COUGH WITH FEVER: Primary | ICD-10-CM

## 2019-02-11 RX ORDER — AMOXICILLIN 875 MG/1
875 TABLET, COATED ORAL 2 TIMES DAILY
Qty: 20 TABLET | Refills: 0 | Status: CANCELLED | OUTPATIENT
Start: 2019-02-11 | End: 2019-02-21

## 2019-02-11 RX ORDER — BENZONATATE 200 MG/1
200 CAPSULE ORAL 3 TIMES DAILY PRN
Qty: 30 CAPSULE | Refills: 1 | Status: CANCELLED | OUTPATIENT
Start: 2019-02-11

## 2019-02-20 DIAGNOSIS — J06.9 VIRAL URI: Primary | ICD-10-CM

## 2019-02-20 RX ORDER — FLUTICASONE PROPIONATE 50 MCG
2 SPRAY, SUSPENSION (ML) NASAL DAILY
Qty: 1 BOTTLE | Refills: 3 | Status: SHIPPED | OUTPATIENT
Start: 2019-02-20 | End: 2020-03-16 | Stop reason: ALTCHOICE

## 2019-03-15 ENCOUNTER — TELEPHONE (OUTPATIENT)
Dept: FAMILY MEDICINE CLINIC | Age: 64
End: 2019-03-15

## 2019-03-15 DIAGNOSIS — Z13.220 SCREENING CHOLESTEROL LEVEL: ICD-10-CM

## 2019-03-15 DIAGNOSIS — I10 ESSENTIAL HYPERTENSION: Primary | ICD-10-CM

## 2019-03-15 DIAGNOSIS — Z12.5 SCREENING FOR PROSTATE CANCER: ICD-10-CM

## 2019-03-15 DIAGNOSIS — Z00.00 PHYSICAL EXAM, ANNUAL: ICD-10-CM

## 2019-03-27 ENCOUNTER — HOSPITAL ENCOUNTER (OUTPATIENT)
Facility: CLINIC | Age: 64
Discharge: HOME | End: 2019-03-27
Payer: COMMERCIAL

## 2019-03-27 DIAGNOSIS — Z13.220 SCREENING CHOLESTEROL LEVEL: ICD-10-CM

## 2019-03-27 DIAGNOSIS — Z12.5 SCREENING FOR PROSTATE CANCER: ICD-10-CM

## 2019-03-27 DIAGNOSIS — Z00.00 PHYSICAL EXAM, ANNUAL: ICD-10-CM

## 2019-03-27 DIAGNOSIS — I10 ESSENTIAL HYPERTENSION: ICD-10-CM

## 2019-03-27 LAB
ALBUMIN SERPL-MCNC: 4.3 G/DL (ref 3.5–5.2)
ALBUMIN/GLOBULIN RATIO: 1.7 (ref 1–2.5)
ALP BLD-CCNC: 82 U/L (ref 40–129)
ALT SERPL-CCNC: 37 U/L (ref 5–41)
ANION GAP SERPL CALCULATED.3IONS-SCNC: 17 MMOL/L (ref 9–17)
AST SERPL-CCNC: 32 U/L
BILIRUB SERPL-MCNC: 0.46 MG/DL (ref 0.3–1.2)
BUN BLDV-MCNC: 13 MG/DL (ref 8–23)
BUN/CREAT BLD: ABNORMAL (ref 9–20)
CALCIUM SERPL-MCNC: 8.8 MG/DL (ref 8.6–10.4)
CHLORIDE BLD-SCNC: 110 MMOL/L (ref 98–107)
CHOLESTEROL/HDL RATIO: 5.1
CHOLESTEROL: 174 MG/DL
CO2: 21 MMOL/L (ref 20–31)
CREAT SERPL-MCNC: 0.94 MG/DL (ref 0.7–1.2)
GFR AFRICAN AMERICAN: >60 ML/MIN
GFR NON-AFRICAN AMERICAN: >60 ML/MIN
GFR SERPL CREATININE-BSD FRML MDRD: ABNORMAL ML/MIN/{1.73_M2}
GFR SERPL CREATININE-BSD FRML MDRD: ABNORMAL ML/MIN/{1.73_M2}
GLUCOSE BLD-MCNC: 98 MG/DL (ref 70–99)
HDLC SERPL-MCNC: 34 MG/DL
LDL CHOLESTEROL: 117 MG/DL (ref 0–130)
POTASSIUM SERPL-SCNC: 4.1 MMOL/L (ref 3.7–5.3)
PROSTATE SPECIFIC ANTIGEN: 1.77 UG/L
SODIUM BLD-SCNC: 148 MMOL/L (ref 135–144)
TOTAL PROTEIN: 6.8 G/DL (ref 6.4–8.3)
TRIGL SERPL-MCNC: 115 MG/DL
VLDLC SERPL CALC-MCNC: ABNORMAL MG/DL (ref 1–30)

## 2019-03-27 PROCEDURE — 36415 COLL VENOUS BLD VENIPUNCTURE: CPT

## 2019-03-27 PROCEDURE — G0103 PSA SCREENING: HCPCS

## 2019-03-27 PROCEDURE — 80053 COMPREHEN METABOLIC PANEL: CPT

## 2019-03-27 PROCEDURE — 80061 LIPID PANEL: CPT

## 2019-04-02 ENCOUNTER — OFFICE VISIT (OUTPATIENT)
Dept: FAMILY MEDICINE CLINIC | Age: 64
End: 2019-04-02
Payer: COMMERCIAL

## 2019-04-02 VITALS
SYSTOLIC BLOOD PRESSURE: 130 MMHG | WEIGHT: 228 LBS | DIASTOLIC BLOOD PRESSURE: 76 MMHG | HEART RATE: 60 BPM | BODY MASS INDEX: 33.67 KG/M2

## 2019-04-02 DIAGNOSIS — I10 ESSENTIAL HYPERTENSION: ICD-10-CM

## 2019-04-02 DIAGNOSIS — K40.90 NON-RECURRENT UNILATERAL INGUINAL HERNIA WITHOUT OBSTRUCTION OR GANGRENE: ICD-10-CM

## 2019-04-02 DIAGNOSIS — M15.9 PRIMARY OSTEOARTHRITIS INVOLVING MULTIPLE JOINTS: ICD-10-CM

## 2019-04-02 DIAGNOSIS — Z00.00 ANNUAL PHYSICAL EXAM: Primary | ICD-10-CM

## 2019-04-02 PROCEDURE — 99396 PREV VISIT EST AGE 40-64: CPT | Performed by: FAMILY MEDICINE

## 2019-04-02 ASSESSMENT — ENCOUNTER SYMPTOMS
APNEA: 1
ALLERGIC/IMMUNOLOGIC NEGATIVE: 1
SHORTNESS OF BREATH: 0
COUGH: 1
WHEEZING: 0
RHINORRHEA: 1
EYES NEGATIVE: 1
ABDOMINAL PAIN: 0
CONSTIPATION: 0

## 2019-04-02 ASSESSMENT — PATIENT HEALTH QUESTIONNAIRE - PHQ9
SUM OF ALL RESPONSES TO PHQ9 QUESTIONS 1 & 2: 0
SUM OF ALL RESPONSES TO PHQ QUESTIONS 1-9: 0
2. FEELING DOWN, DEPRESSED OR HOPELESS: 0
SUM OF ALL RESPONSES TO PHQ QUESTIONS 1-9: 0
1. LITTLE INTEREST OR PLEASURE IN DOING THINGS: 0

## 2019-04-02 NOTE — PROGRESS NOTES
[facility] PHYSIC[facility]ANS  TIMI GONCALVES [facility]  [address]  [address] [address]  Dept: [phone]    4/2/2019    CHIEF COMPLAINT    Chief Complaint   Patient presents with    Annual Exam       HPI    Grant Singh is a 59 y.o. male who presents   Chief Complaint   Patient presents with    Annual Exam   .  Physical exam.   Had labs done which were normal. HDL improving. Hx of knee OA, rt knee replaced but still limited. May be having left total knee replaced in the future. Walking daily, has tripped on uneven ground. Vitals:    04/02/19 0737   BP: 130/76   Pulse: 60   Weight: 228 lb (103.4 kg)       REVIEW OF SYSTEMS    Review of Systems   Constitutional: Negative for fatigue, fever and unexpected weight change. HENT: Positive for congestion, postnasal drip and rhinorrhea. Eyes: Negative. Respiratory: Positive for apnea (chronic) and cough (mild). Negative for shortness of breath and wheezing. Using cpap nightly   Cardiovascular: Negative for chest pain, palpitations and leg swelling. Gastrointestinal: Negative for abdominal pain and constipation. Endocrine: Negative. Genitourinary: Negative for frequency and urgency. Musculoskeletal: Positive for arthralgias (knee pain). Skin: Negative. Allergic/Immunologic: Negative. Neurological: Negative for dizziness and headaches. Hematological: Negative. Psychiatric/Behavioral: The patient is not nervous/anxious.         PAST MEDICAL HISTORY    Past Medical History:   Diagnosis Date    Class 3 severe obesity due to excess calories with body mass index (BMI) greater than or equal to 70 in adult [facility])     Diverticulosis large intestine w/o perforation or abscess w/bleeding     Hypertension     Obesity     LEISA on CPAP     Osteoarthritis     Sleep apnea        FAMILY HISTORY    Family History   Problem Relation Age of Onset    Dementia Mother     Dementia Father    Carlos Aguilar Kidney Disease Father     Cancer Brother     Brain Cancer Brother     Lung Cancer Brother     Heart Disease Brother        SOCIAL HISTORY    Social History     Socioeconomic History    Marital status:      Spouse name: None    Number of children: None    Years of education: None    Highest education level: None   Occupational History    None   Social Needs    Financial resource strain: None    Food insecurity:     Worry: None     Inability: None    Transportation needs:     Medical: None     Non-medical: None   Tobacco Use    Smoking status: Never Smoker    Smokeless tobacco: Never Used   Substance and Sexual Activity    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    Drug use: No    Sexual activity: Yes     Partners: Female   Lifestyle    Physical activity:     Days per week: None     Minutes per session: None    Stress: None   Relationships    Social connections:     Talks on phone: None     Gets together: None     Attends Protestant service: None     Active member of club or organization: None     Attends meetings of clubs or organizations: None     Relationship status: None    Intimate partner violence:     Fear of current or ex partner: None     Emotionally abused: None     Physically abused: None     Forced sexual activity: None   Other Topics Concern    None   Social History Narrative    None       SURGICAL HISTORY    Past Surgical History:   Procedure Laterality Date    [facility]    EYE SURGERY      as a child, eye extrusion    JOINT REPLACEMENT Right 2018    knee    KNEE ARTHROSCOPY Left 2010       CURRENT MEDICATIONS    Current Outpatient Medications   Medication Sig Dispense Refill    fluticasone (FLONASE) 50 MCG/ACT nasal spray 2 sprays by Nasal route daily 1 Bottle 3    lisinopril-hydrochlorothiazide (PRINZIDE;ZESTORETIC) 20-12.5 MG per tablet Take 1 tablet by mouth daily 90 tablet 3    aspirin EC 81 MG EC tablet Take 1 tablet by mouth daily 30 tablet 3    cyclobenzaprine (FLEXERIL) 10 MG tablet       celecoxib (CELEBREX) 200 MG capsule       clotrimazole-betamethasone (LOTRISONE) 1-0.05 % cream        No current facility-administered medications for this visit. ALLERGIES    No Known Allergies    PHYSICAL EXAM   Physical Exam   Constitutional: He is oriented to person, place, and time. He appears well-developed and well-nourished. HENT:   Head: Normocephalic. Nose: Mucosal edema and rhinorrhea present. Right sinus exhibits no maxillary sinus tenderness and no frontal sinus tenderness. Left sinus exhibits no maxillary sinus tenderness and no frontal sinus tenderness. Mouth/Throat: Oropharynx is clear and moist and mucous membranes are normal.   Eyes: Pupils are equal, round, and reactive to light. Neck: Normal range of motion. Neck supple. No thyromegaly present. Cardiovascular: Normal rate, regular rhythm and normal heart sounds. No murmur heard. Pulmonary/Chest: Effort normal and breath sounds normal. He has no wheezes. He has no rales. Abdominal: Soft. There is no tenderness. There is no rebound and no guarding. A hernia (rt inguinal) is present. Musculoskeletal: Normal range of motion. He exhibits no edema, tenderness or deformity. OA changes   Lymphadenopathy:     He has no cervical adenopathy. Neurological: He is alert and oriented to person, place, and time. Skin: Skin is warm and dry. Psychiatric: He has a normal mood and affect. His behavior is normal.   Vitals reviewed. Assessment/PLan  1. Annual physical exam  Cont healthy lifestyle. 2. Essential hypertension  Chronic, stable, continue current medication and/or plan      3. Primary osteoarthritis involving multiple joints  Chronic, cont seeing ortho as needed    4.  Non-recurrent unilateral inguinal hernia without obstruction or gangrene  Will consider having surgery depending on the time frame  - [facility] - Olivia Andrews MD, General Surgery,

## 2019-04-03 ENCOUNTER — TELEPHONE (OUTPATIENT)
Dept: FAMILY MEDICINE CLINIC | Age: 64
End: 2019-04-03

## 2019-04-03 DIAGNOSIS — Z13.0 SCREENING FOR ENDOCRINE, METABOLIC AND IMMUNITY DISORDER: Primary | ICD-10-CM

## 2019-04-03 DIAGNOSIS — Z13.228 SCREENING FOR ENDOCRINE, METABOLIC AND IMMUNITY DISORDER: Primary | ICD-10-CM

## 2019-04-03 DIAGNOSIS — Z13.29 SCREENING FOR ENDOCRINE, METABOLIC AND IMMUNITY DISORDER: Primary | ICD-10-CM

## 2019-04-03 NOTE — TELEPHONE ENCOUNTER
Patient not sure if he had measels or chicken pox as a kid, can he have a blood test to check, and if he DIDN'T have chicken pox, should he still get the shingles vaccine?

## 2019-04-04 NOTE — TELEPHONE ENCOUNTER
Yes he could get measles and varicella titers.  If not immune he can get shingles and measles vaccine

## 2019-04-05 ENCOUNTER — HOSPITAL ENCOUNTER (OUTPATIENT)
Facility: CLINIC | Age: 64
Discharge: HOME | End: 2019-04-05
Payer: COMMERCIAL

## 2019-04-05 DIAGNOSIS — Z13.0 SCREENING FOR ENDOCRINE, METABOLIC AND IMMUNITY DISORDER: ICD-10-CM

## 2019-04-05 DIAGNOSIS — Z13.228 SCREENING FOR ENDOCRINE, METABOLIC AND IMMUNITY DISORDER: ICD-10-CM

## 2019-04-05 DIAGNOSIS — Z13.29 SCREENING FOR ENDOCRINE, METABOLIC AND IMMUNITY DISORDER: ICD-10-CM

## 2019-04-05 PROCEDURE — 86765 RUBEOLA ANTIBODY: CPT

## 2019-04-05 PROCEDURE — 36415 COLL VENOUS BLD VENIPUNCTURE: CPT

## 2019-04-05 PROCEDURE — 86787 VARICELLA-ZOSTER ANTIBODY: CPT

## 2019-04-08 LAB
MEASLES IMMUNE (IGG): 2.1
VZV IGG SER QL IA: 3.74

## 2019-04-18 ENCOUNTER — OFFICE VISIT (OUTPATIENT)
Dept: PULMONOLOGY | Age: 64
End: 2019-04-18
Payer: COMMERCIAL

## 2019-04-18 VITALS
WEIGHT: 227 LBS | DIASTOLIC BLOOD PRESSURE: 74 MMHG | RESPIRATION RATE: 14 BRPM | HEART RATE: 59 BPM | HEIGHT: 69 IN | SYSTOLIC BLOOD PRESSURE: 131 MMHG | OXYGEN SATURATION: 96 % | BODY MASS INDEX: 33.62 KG/M2

## 2019-04-18 DIAGNOSIS — G47.33 OSA ON CPAP: Primary | ICD-10-CM

## 2019-04-18 DIAGNOSIS — Z99.89 OSA ON CPAP: Primary | ICD-10-CM

## 2019-04-18 DIAGNOSIS — E66.01 CLASS 2 SEVERE OBESITY DUE TO EXCESS CALORIES WITH SERIOUS COMORBIDITY IN ADULT, UNSPECIFIED BMI (HCC): ICD-10-CM

## 2019-04-18 DIAGNOSIS — I10 ESSENTIAL HYPERTENSION: ICD-10-CM

## 2019-04-18 PROCEDURE — 99213 OFFICE O/P EST LOW 20 MIN: CPT | Performed by: INTERNAL MEDICINE

## 2019-04-18 ASSESSMENT — SLEEP AND FATIGUE QUESTIONNAIRES
HOW LIKELY ARE YOU TO NOD OFF OR FALL ASLEEP WHILE SITTING QUIETLY AFTER LUNCH WITHOUT ALCOHOL: 0
HOW LIKELY ARE YOU TO NOD OFF OR FALL ASLEEP IN A CAR, WHILE STOPPED FOR A FEW MINUTES IN TRAFFIC: 0
HOW LIKELY ARE YOU TO NOD OFF OR FALL ASLEEP WHILE WATCHING TV: 2
ESS TOTAL SCORE: 7
HOW LIKELY ARE YOU TO NOD OFF OR FALL ASLEEP WHILE SITTING INACTIVE IN A PUBLIC PLACE: 0
HOW LIKELY ARE YOU TO NOD OFF OR FALL ASLEEP WHILE SITTING AND READING: 2
HOW LIKELY ARE YOU TO NOD OFF OR FALL ASLEEP WHILE SITTING AND TALKING TO SOMEONE: 0
HOW LIKELY ARE YOU TO NOD OFF OR FALL ASLEEP WHILE LYING DOWN TO REST IN THE AFTERNOON WHEN CIRCUMSTANCES PERMIT: 3
HOW LIKELY ARE YOU TO NOD OFF OR FALL ASLEEP WHEN YOU ARE A PASSENGER IN A CAR FOR AN HOUR WITHOUT A BREAK: 0

## 2019-04-22 NOTE — PATIENT INSTRUCTIONS
Patient DME order and progress note was faxed to Winsome Sheth at [phone] and [phone]. 04/22/2019 DEVYN.

## 2019-07-20 ENCOUNTER — HOSPITAL ENCOUNTER (OUTPATIENT)
Facility: CLINIC | Age: 64
Discharge: HOME | End: 2019-07-20
Payer: COMMERCIAL

## 2019-07-20 LAB
ABSOLUTE EOS #: 0.19 K/UL (ref 0–0.44)
ABSOLUTE IMMATURE GRANULOCYTE: 0.03 K/UL (ref 0–0.3)
ABSOLUTE LYMPH #: 2.27 K/UL (ref 1.1–3.7)
ABSOLUTE MONO #: 0.61 K/UL (ref 0.1–1.2)
ALBUMIN SERPL-MCNC: 4 G/DL (ref 3.5–5.2)
ALBUMIN/GLOBULIN RATIO: 1.5 (ref 1–2.5)
ALP BLD-CCNC: 85 U/L (ref 40–129)
ALT SERPL-CCNC: 25 U/L (ref 5–41)
ANION GAP SERPL CALCULATED.3IONS-SCNC: 12 MMOL/L (ref 9–17)
AST SERPL-CCNC: 15 U/L
BASOPHILS # BLD: 0 % (ref 0–2)
BASOPHILS ABSOLUTE: 0.03 K/UL (ref 0–0.2)
BILIRUB SERPL-MCNC: 0.26 MG/DL (ref 0.3–1.2)
BILIRUBIN DIRECT: 0.09 MG/DL
BILIRUBIN URINE: NEGATIVE
BUN BLDV-MCNC: 11 MG/DL (ref 8–23)
BUN/CREAT BLD: ABNORMAL (ref 9–20)
CALCIUM SERPL-MCNC: 8.8 MG/DL (ref 8.6–10.4)
CHLORIDE BLD-SCNC: 104 MMOL/L (ref 98–107)
CHOLESTEROL, FASTING: 172 MG/DL
CHOLESTEROL/HDL RATIO: 5.4
CO2: 26 MMOL/L (ref 20–31)
COLOR: YELLOW
COMMENT UA: NORMAL
CREAT SERPL-MCNC: 1.04 MG/DL (ref 0.7–1.2)
CREATININE URINE: 161.3 MG/DL (ref 39–259)
DIFFERENTIAL TYPE: NORMAL
EOSINOPHILS RELATIVE PERCENT: 3 % (ref 1–4)
GFR AFRICAN AMERICAN: >60 ML/MIN
GFR NON-AFRICAN AMERICAN: >60 ML/MIN
GFR SERPL CREATININE-BSD FRML MDRD: ABNORMAL ML/MIN/{1.73_M2}
GFR SERPL CREATININE-BSD FRML MDRD: ABNORMAL ML/MIN/{1.73_M2}
GLUCOSE FASTING: 105 MG/DL (ref 70–99)
GLUCOSE URINE: NEGATIVE
HBV SURFACE AB TITR SER: <3.5 MIU/ML
HCT VFR BLD CALC: 47.6 % (ref 40.7–50.3)
HDLC SERPL-MCNC: 32 MG/DL
HEMOGLOBIN: 15.4 G/DL (ref 13–17)
HEPATITIS B CORE TOTAL ANTIBODY: NONREACTIVE
HEPATITIS B SURFACE ANTIGEN: NONREACTIVE
HEPATITIS C ANTIBODY: NONREACTIVE
HIV AG/AB: NONREACTIVE
IMMATURE GRANULOCYTES: 0 %
INR BLD: 0.9
KETONES, URINE: NEGATIVE
LDL CHOLESTEROL: 113 MG/DL (ref 0–130)
LEUKOCYTE ESTERASE, URINE: NEGATIVE
LYMPHOCYTES # BLD: 32 % (ref 24–43)
MCH RBC QN AUTO: 29.4 PG (ref 25.2–33.5)
MCHC RBC AUTO-ENTMCNC: 32.4 G/DL (ref 28.4–34.8)
MCV RBC AUTO: 91 FL (ref 82.6–102.9)
MICROALBUMIN/CREAT 24H UR: <12 MG/L
MICROALBUMIN/CREAT UR-RTO: NORMAL MCG/MG CREAT
MONOCYTES # BLD: 9 % (ref 3–12)
NITRITE, URINE: NEGATIVE
NRBC AUTOMATED: 0 PER 100 WBC
PARTIAL THROMBOPLASTIN TIME: 23.6 SEC (ref 21.3–31.3)
PDW BLD-RTO: 13 % (ref 11.8–14.4)
PH UA: 5.5 (ref 5–8)
PHOSPHORUS: 3.2 MG/DL (ref 2.5–4.5)
PLATELET # BLD: 180 K/UL (ref 138–453)
PLATELET ESTIMATE: NORMAL
PMV BLD AUTO: 10.5 FL (ref 8.1–13.5)
POTASSIUM SERPL-SCNC: 4.5 MMOL/L (ref 3.7–5.3)
PROSTATE SPECIFIC ANTIGEN: 1.82 UG/L
PROTEIN UA: NEGATIVE
PROTHROMBIN TIME: 9.3 SEC (ref 9.4–12.6)
RBC # BLD: 5.23 M/UL (ref 4.21–5.77)
RBC # BLD: NORMAL 10*6/UL
SEG NEUTROPHILS: 56 % (ref 36–65)
SEGMENTED NEUTROPHILS ABSOLUTE COUNT: 4.06 K/UL (ref 1.5–8.1)
SODIUM BLD-SCNC: 142 MMOL/L (ref 135–144)
SPECIFIC GRAVITY UA: 1.02 (ref 1–1.03)
T. PALLIDUM, IGG: NONREACTIVE
TOTAL PROTEIN: 6.7 G/DL (ref 6.4–8.3)
TRIGLYCERIDE, FASTING: 133 MG/DL
TURBIDITY: CLEAR
URIC ACID: 6.9 MG/DL (ref 3.4–7)
URINE HGB: NEGATIVE
UROBILINOGEN, URINE: NORMAL
VLDLC SERPL CALC-MCNC: ABNORMAL MG/DL (ref 1–30)
WBC # BLD: 7.2 K/UL (ref 3.5–11.3)
WBC # BLD: NORMAL 10*3/UL

## 2019-07-20 PROCEDURE — 86704 HEP B CORE ANTIBODY TOTAL: CPT

## 2019-07-20 PROCEDURE — 82570 ASSAY OF URINE CREATININE: CPT

## 2019-07-20 PROCEDURE — 85025 COMPLETE CBC W/AUTO DIFF WBC: CPT

## 2019-07-20 PROCEDURE — 84550 ASSAY OF BLOOD/URIC ACID: CPT

## 2019-07-20 PROCEDURE — 86803 HEPATITIS C AB TEST: CPT

## 2019-07-20 PROCEDURE — 86665 EPSTEIN-BARR CAPSID VCA: CPT

## 2019-07-20 PROCEDURE — 85730 THROMBOPLASTIN TIME PARTIAL: CPT

## 2019-07-20 PROCEDURE — 87086 URINE CULTURE/COLONY COUNT: CPT

## 2019-07-20 PROCEDURE — 86644 CMV ANTIBODY: CPT

## 2019-07-20 PROCEDURE — 86645 CMV ANTIBODY IGM: CPT

## 2019-07-20 PROCEDURE — 85610 PROTHROMBIN TIME: CPT

## 2019-07-20 PROCEDURE — 86664 EPSTEIN-BARR NUCLEAR ANTIGEN: CPT

## 2019-07-20 PROCEDURE — 86317 IMMUNOASSAY INFECTIOUS AGENT: CPT

## 2019-07-20 PROCEDURE — 83036 HEMOGLOBIN GLYCOSYLATED A1C: CPT

## 2019-07-20 PROCEDURE — 87340 HEPATITIS B SURFACE AG IA: CPT

## 2019-07-20 PROCEDURE — 80053 COMPREHEN METABOLIC PANEL: CPT

## 2019-07-20 PROCEDURE — 82248 BILIRUBIN DIRECT: CPT

## 2019-07-20 PROCEDURE — 82043 UR ALBUMIN QUANTITATIVE: CPT

## 2019-07-20 PROCEDURE — 84100 ASSAY OF PHOSPHORUS: CPT

## 2019-07-20 PROCEDURE — 36415 COLL VENOUS BLD VENIPUNCTURE: CPT

## 2019-07-20 PROCEDURE — 86780 TREPONEMA PALLIDUM: CPT

## 2019-07-20 PROCEDURE — G0103 PSA SCREENING: HCPCS

## 2019-07-20 PROCEDURE — 80061 LIPID PANEL: CPT

## 2019-07-20 PROCEDURE — 82610 CYSTATIN C: CPT

## 2019-07-20 PROCEDURE — 81003 URINALYSIS AUTO W/O SCOPE: CPT

## 2019-07-20 PROCEDURE — 87389 HIV-1 AG W/HIV-1&-2 AB AG IA: CPT

## 2019-07-21 LAB
CULTURE: NORMAL
CYSTATIN C: 1 MG/L (ref 0.5–1.2)
EGFR BY CYSTATIN C: 76 ML/MIN/BSA
ESTIMATED AVERAGE GLUCOSE: 128 MG/DL
HBA1C MFR BLD: 6.1 % (ref 4–6)
Lab: NORMAL
SPECIMEN DESCRIPTION: NORMAL

## 2019-07-22 LAB
EBV NUCLEAR AG AB: 444 U/ML
EPSTEIN-BARR VCA IGG: 322 U/ML
EPSTEIN-BARR VCA IGM: 7 U/ML

## 2019-07-23 LAB
CMV IGM: 0.1
CYTOMEGALOVIRUS IGG ANTIBODY: 9

## 2019-08-21 ENCOUNTER — TELEPHONE (OUTPATIENT)
Dept: FAMILY MEDICINE CLINIC | Age: 64
End: 2019-08-21

## 2019-08-25 ENCOUNTER — HOSPITAL ENCOUNTER (OUTPATIENT)
Facility: CLINIC | Age: 64
Discharge: HOME | End: 2019-08-25
Payer: COMMERCIAL

## 2019-08-25 LAB
AMOUNT GLUCOSE GIVEN: 75 G
GLUCOSE FASTING: 94 MG/DL (ref 65–99)
GLUCOSE TOLERANCE TEST 1 HOUR: 186 MG/DL (ref 65–184)
GLUCOSE TOLERANCE TEST 2 HOUR: 128 MG/DL (ref 65–139)

## 2019-08-25 PROCEDURE — 82951 GLUCOSE TOLERANCE TEST (GTT): CPT

## 2019-08-25 PROCEDURE — 36415 COLL VENOUS BLD VENIPUNCTURE: CPT

## 2019-09-17 DIAGNOSIS — I10 ESSENTIAL HYPERTENSION: ICD-10-CM

## 2019-09-17 RX ORDER — LISINOPRIL AND HYDROCHLOROTHIAZIDE 20; 12.5 MG/1; MG/1
1 TABLET ORAL DAILY
Qty: 90 TABLET | Refills: 0 | Status: SHIPPED | OUTPATIENT
Start: 2019-09-17 | End: 2020-02-11

## 2019-10-02 ENCOUNTER — OFFICE VISIT (OUTPATIENT)
Dept: FAMILY MEDICINE CLINIC | Age: 64
End: 2019-10-02
Payer: COMMERCIAL

## 2019-10-02 VITALS — DIASTOLIC BLOOD PRESSURE: 80 MMHG | WEIGHT: 226 LBS | BODY MASS INDEX: 33.37 KG/M2 | SYSTOLIC BLOOD PRESSURE: 120 MMHG

## 2019-10-02 DIAGNOSIS — E78.6 LOW HDL (UNDER 40): ICD-10-CM

## 2019-10-02 DIAGNOSIS — M15.9 PRIMARY OSTEOARTHRITIS INVOLVING MULTIPLE JOINTS: ICD-10-CM

## 2019-10-02 DIAGNOSIS — I10 ESSENTIAL HYPERTENSION: Primary | ICD-10-CM

## 2019-10-02 PROCEDURE — 99214 OFFICE O/P EST MOD 30 MIN: CPT | Performed by: FAMILY MEDICINE

## 2019-10-02 ASSESSMENT — ENCOUNTER SYMPTOMS
COUGH: 0
EYES NEGATIVE: 1
ABDOMINAL PAIN: 0
SHORTNESS OF BREATH: 0

## 2019-11-06 DIAGNOSIS — Z12.11 SCREEN FOR COLON CANCER: Primary | ICD-10-CM

## 2019-11-27 ENCOUNTER — OFFICE VISIT (OUTPATIENT)
Dept: PULMONOLOGY | Age: 64
End: 2019-11-27
Payer: COMMERCIAL

## 2019-11-27 VITALS
RESPIRATION RATE: 14 BRPM | SYSTOLIC BLOOD PRESSURE: 132 MMHG | HEIGHT: 68 IN | TEMPERATURE: 97 F | DIASTOLIC BLOOD PRESSURE: 73 MMHG | OXYGEN SATURATION: 95 % | HEART RATE: 68 BPM | BODY MASS INDEX: 34.4 KG/M2 | WEIGHT: 227 LBS

## 2019-11-27 DIAGNOSIS — E66.01 CLASS 2 SEVERE OBESITY DUE TO EXCESS CALORIES WITH SERIOUS COMORBIDITY IN ADULT, UNSPECIFIED BMI (HCC): ICD-10-CM

## 2019-11-27 DIAGNOSIS — G47.33 OSA ON CPAP: Primary | ICD-10-CM

## 2019-11-27 DIAGNOSIS — Z99.89 OSA ON CPAP: Primary | ICD-10-CM

## 2019-11-27 PROCEDURE — 99213 OFFICE O/P EST LOW 20 MIN: CPT | Performed by: INTERNAL MEDICINE

## 2019-11-27 ASSESSMENT — SLEEP AND FATIGUE QUESTIONNAIRES
HOW LIKELY ARE YOU TO NOD OFF OR FALL ASLEEP WHILE WATCHING TV: 1
HOW LIKELY ARE YOU TO NOD OFF OR FALL ASLEEP WHILE SITTING INACTIVE IN A PUBLIC PLACE: 1
HOW LIKELY ARE YOU TO NOD OFF OR FALL ASLEEP WHEN YOU ARE A PASSENGER IN A CAR FOR AN HOUR WITHOUT A BREAK: 0
HOW LIKELY ARE YOU TO NOD OFF OR FALL ASLEEP WHILE LYING DOWN TO REST IN THE AFTERNOON WHEN CIRCUMSTANCES PERMIT: 3
HOW LIKELY ARE YOU TO NOD OFF OR FALL ASLEEP WHILE SITTING QUIETLY AFTER LUNCH WITHOUT ALCOHOL: 1
ESS TOTAL SCORE: 8
HOW LIKELY ARE YOU TO NOD OFF OR FALL ASLEEP WHILE SITTING AND TALKING TO SOMEONE: 0
HOW LIKELY ARE YOU TO NOD OFF OR FALL ASLEEP IN A CAR, WHILE STOPPED FOR A FEW MINUTES IN TRAFFIC: 0
HOW LIKELY ARE YOU TO NOD OFF OR FALL ASLEEP WHILE SITTING AND READING: 2

## 2019-12-10 ENCOUNTER — OFFICE VISIT (OUTPATIENT)
Dept: FAMILY MEDICINE CLINIC | Age: 64
End: 2019-12-10
Payer: COMMERCIAL

## 2019-12-10 VITALS
DIASTOLIC BLOOD PRESSURE: 84 MMHG | SYSTOLIC BLOOD PRESSURE: 138 MMHG | BODY MASS INDEX: 34.82 KG/M2 | HEART RATE: 60 BPM | WEIGHT: 229 LBS

## 2019-12-10 DIAGNOSIS — M15.9 PRIMARY OSTEOARTHRITIS INVOLVING MULTIPLE JOINTS: ICD-10-CM

## 2019-12-10 DIAGNOSIS — K57.31 DIVERTICULOSIS LARGE INTESTINE W/O PERFORATION OR ABSCESS W/BLEEDING: Primary | ICD-10-CM

## 2019-12-10 DIAGNOSIS — I10 ESSENTIAL HYPERTENSION: ICD-10-CM

## 2019-12-10 PROBLEM — G47.33 OSA ON CPAP: Status: ACTIVE | Noted: 2018-04-13

## 2019-12-10 PROBLEM — Z99.89 OSA ON CPAP: Status: ACTIVE | Noted: 2018-04-13

## 2019-12-10 PROBLEM — K63.5 BENIGN COLON POLYP: Status: ACTIVE | Noted: 2019-10-30

## 2019-12-10 PROBLEM — Z87.898 HISTORY OF SEIZURE: Status: ACTIVE | Noted: 2018-04-13

## 2019-12-10 PROBLEM — K57.30 DIVERTICULOSIS LARGE INTESTINE W/O PERFORATION OR ABSCESS W/O BLEEDING: Status: ACTIVE | Noted: 2019-10-30

## 2019-12-10 PROCEDURE — 99213 OFFICE O/P EST LOW 20 MIN: CPT | Performed by: FAMILY MEDICINE

## 2019-12-10 ASSESSMENT — ENCOUNTER SYMPTOMS
SHORTNESS OF BREATH: 1
CONSTIPATION: 0
ALLERGIC/IMMUNOLOGIC NEGATIVE: 1
EYES NEGATIVE: 1
DIARRHEA: 0
ABDOMINAL DISTENTION: 0
ABDOMINAL PAIN: 0

## 2020-01-03 ENCOUNTER — OFFICE VISIT (OUTPATIENT)
Dept: FAMILY MEDICINE CLINIC | Age: 65
End: 2020-01-03
Payer: COMMERCIAL

## 2020-01-03 VITALS
DIASTOLIC BLOOD PRESSURE: 84 MMHG | SYSTOLIC BLOOD PRESSURE: 130 MMHG | TEMPERATURE: 98.1 F | WEIGHT: 225 LBS | HEART RATE: 64 BPM | BODY MASS INDEX: 34.21 KG/M2

## 2020-01-03 LAB
CHP ED QC CHECK: NORMAL
GLUCOSE BLD-MCNC: 150 MG/DL
HBA1C MFR BLD: 6 %

## 2020-01-03 PROCEDURE — 99214 OFFICE O/P EST MOD 30 MIN: CPT | Performed by: FAMILY MEDICINE

## 2020-01-03 PROCEDURE — 82962 GLUCOSE BLOOD TEST: CPT | Performed by: FAMILY MEDICINE

## 2020-01-03 PROCEDURE — 93000 ELECTROCARDIOGRAM COMPLETE: CPT | Performed by: FAMILY MEDICINE

## 2020-01-03 PROCEDURE — 83036 HEMOGLOBIN GLYCOSYLATED A1C: CPT | Performed by: FAMILY MEDICINE

## 2020-01-03 RX ORDER — OMEPRAZOLE 20 MG/1
20 CAPSULE, DELAYED RELEASE ORAL
Qty: 30 CAPSULE | Refills: 5
Start: 2020-01-03 | End: 2020-03-16 | Stop reason: ALTCHOICE

## 2020-01-03 ASSESSMENT — ENCOUNTER SYMPTOMS
SHORTNESS OF BREATH: 0
ALLERGIC/IMMUNOLOGIC NEGATIVE: 1
COUGH: 0
DIARRHEA: 0
CONSTIPATION: 0
ABDOMINAL PAIN: 1
EYES NEGATIVE: 1
BLOOD IN STOOL: 0

## 2020-01-03 ASSESSMENT — PATIENT HEALTH QUESTIONNAIRE - PHQ9
2. FEELING DOWN, DEPRESSED OR HOPELESS: 0
SUM OF ALL RESPONSES TO PHQ QUESTIONS 1-9: 0
SUM OF ALL RESPONSES TO PHQ QUESTIONS 1-9: 0
1. LITTLE INTEREST OR PLEASURE IN DOING THINGS: 0
SUM OF ALL RESPONSES TO PHQ9 QUESTIONS 1 & 2: 0

## 2020-01-03 NOTE — PROGRESS NOTES
 Intimate partner violence:     Fear of current or ex partner: None     Emotionally abused: None     Physically abused: None     Forced sexual activity: None   Other Topics Concern    None   Social History Narrative    None       SURGICAL HISTORY    Past Surgical History:   Procedure Laterality Date    [facility]    EYE SURGERY      as a child, eye extrusion    JOINT REPLACEMENT Right 2018    knee    KNEE ARTHROSCOPY Left 2010       CURRENT MEDICATIONS    Current Outpatient Medications   Medication Sig Dispense Refill    omeprazole (PRILOSEC) 20 MG delayed release capsule Take 1 capsule by mouth every morning (before breakfast) 30 capsule 5    lisinopril-hydrochlorothiazide (PRINZIDE;ZESTORETIC) 20-12.5 MG per tablet Take 1 tablet by mouth daily 90 tablet 0    diclofenac sodium 1 % GEL Apply 2 g topically 4 times daily (Patient not taking: Reported on 1/3/2020) 3 Tube 3    fluticasone (FLONASE) 50 MCG/ACT nasal spray 2 sprays by Nasal route daily (Patient not taking: Reported on 1/3/2020) 1 Bottle 3     No current facility-administered medications for this visit. ALLERGIES    No Known Allergies    PHYSICAL EXAM   Physical Exam  Vitals signs reviewed. Constitutional:       Appearance: He is well-developed. HENT:      Head: Normocephalic. Comments: Face flushed     Mouth/Throat:      Mouth: Mucous membranes are moist.   Eyes:      Conjunctiva/sclera: Conjunctivae normal.      Pupils: Pupils are equal, round, and reactive to light. Neck:      Musculoskeletal: Normal range of motion and neck supple. Thyroid: No thyromegaly. Cardiovascular:      Rate and Rhythm: Normal rate and regular rhythm. Heart sounds: Normal heart sounds. No murmur. Pulmonary:      Effort: Pulmonary effort is normal.      Breath sounds: Normal breath sounds. No wheezing or rales. Abdominal:      Palpations: Abdomen is soft. Tenderness: There is tenderness (epigastrium).  There is no guarding or rebound. Musculoskeletal: Normal range of motion. General: No tenderness or deformity. Lymphadenopathy:      Cervical: No cervical adenopathy. Skin:     General: Skin is warm and dry. Neurological:      Mental Status: He is alert and oriented to person, place, and time. Coordination: Coordination normal.   Psychiatric:         Behavior: Behavior normal.         Assessment/PLan  1. Chest pain, unspecified type  ekg normal. Cont to monitor sx.   - EKG 12 Lead; Future  - EKG 12 Lead    2. Pre-diabetes  Discussed low carb diet. - POCT glycosylated hemoglobin (Hb A1C)-6.0  - POCT Glucose-150    3. Essential hypertension  Improved after sitting in office. Cont meds and monitor at home. 4. Gastroesophageal reflux disease without esophagitis  Start ppi. Small meals, reduce caffeine and soda. - omeprazole (PRILOSEC) 20 MG delayed release capsule; Take 1 capsule by mouth every morning (before breakfast)  Dispense: 30 capsule; Refill: 5      Rafita received counseling on the following healthy behaviors: nutrition, exercise and medication adherence  Reviewed prior labs and health maintenance. Continue current medications, diet and exercise. Discussed use, benefit, and side effects of prescribed medications. Barriers to medication compliance addressed. Patient given educational materials - see patient instructions. All patient questions answered. Patient voiced understanding. Return if symptoms worsen or fail to improve.         Electronically signed by Ricky Figueredo MD on 1/3/20 at 11:51 AM

## 2020-03-06 ENCOUNTER — TELEPHONE (OUTPATIENT)
Dept: FAMILY MEDICINE CLINIC | Age: 65
End: 2020-03-06

## 2020-03-06 NOTE — TELEPHONE ENCOUNTER
Patient has AMW in April and wonders if he should get some labs prior.   Let him know, he will  order

## 2020-03-13 ENCOUNTER — TELEPHONE (OUTPATIENT)
Dept: PULMONOLOGY | Age: 65
End: 2020-03-13

## 2020-03-16 ENCOUNTER — OFFICE VISIT (OUTPATIENT)
Dept: PULMONOLOGY | Age: 65
End: 2020-03-16
Payer: COMMERCIAL

## 2020-03-16 VITALS
RESPIRATION RATE: 18 BRPM | HEART RATE: 75 BPM | BODY MASS INDEX: 34.56 KG/M2 | SYSTOLIC BLOOD PRESSURE: 139 MMHG | HEIGHT: 68 IN | DIASTOLIC BLOOD PRESSURE: 94 MMHG | WEIGHT: 228 LBS | OXYGEN SATURATION: 98 %

## 2020-03-16 PROCEDURE — G8484 FLU IMMUNIZE NO ADMIN: HCPCS | Performed by: NURSE PRACTITIONER

## 2020-03-16 PROCEDURE — G8427 DOCREV CUR MEDS BY ELIG CLIN: HCPCS | Performed by: NURSE PRACTITIONER

## 2020-03-16 PROCEDURE — 3017F COLORECTAL CA SCREEN DOC REV: CPT | Performed by: NURSE PRACTITIONER

## 2020-03-16 PROCEDURE — 99213 OFFICE O/P EST LOW 20 MIN: CPT | Performed by: NURSE PRACTITIONER

## 2020-03-16 PROCEDURE — G8417 CALC BMI ABV UP PARAM F/U: HCPCS | Performed by: NURSE PRACTITIONER

## 2020-03-16 PROCEDURE — 1036F TOBACCO NON-USER: CPT | Performed by: NURSE PRACTITIONER

## 2020-03-16 ASSESSMENT — ENCOUNTER SYMPTOMS
RESPIRATORY NEGATIVE: 1
EYES NEGATIVE: 1
ALLERGIC/IMMUNOLOGIC NEGATIVE: 1
GASTROINTESTINAL NEGATIVE: 1

## 2020-03-16 NOTE — PROGRESS NOTES
dme Subjective:      Patient ID: Al Whitney is a 59 y.o. male. HPI:    For follow-up for LEISA on CPAP. Compliance report reviewed from 12/14/2019 through 3/12/2020.  90 out of 90 days used, all 90 days for more than 4 hours. Average use is 6 hours and 53 minutes. He is on CPAP 13 cm H2O as directed from 2018 sleep study. Leak index is 3.6, AHI is 7.8. Patient utilizes a full facemask, and has a goatee. He reports that he is getting a good night sleep. Recently started using distilled water for humidification and has noticed an improvement in his dry mouth. Medications:  No pulmonary medications    Vaccines:  Flu Becca 9/13/2019.     Sleep Medicine 11/27/2019 4/18/2019 12/6/2018 10/9/2018   Sitting and reading 2 2 - 3   Watching TV 1 2 - 3   Sitting, inactive in a public place (e.g. a theatre or a meeting) 1 0 - 0   As a passenger in a car for an hour without a break 0 0 - 3   Lying down to rest in the afternoon when circumstances permit 3 3 - 3   Sitting and talking to someone 0 0 - 0   Sitting quietly after a lunch without alcohol 1 0 - 2   In a car, while stopped for a few minutes in traffic 0 0 - 0   Total score 8 7 - 14   Neck circumference - - 17.5 -       BP (!) 139/94 (Site: Left Upper Arm, Position: Sitting, Cuff Size: Large Adult)   Pulse 75   Resp 18   Ht 5' 8\" (1.727 m)   Wt 228 lb (103.4 kg)   SpO2 98% Comment: room air at rest  BMI 34.67 kg/m²     Past Medical History:   Diagnosis Date    Class 3 severe obesity due to excess calories with body mass index (BMI) greater than or equal to 70 in adult [facility])     Diverticulosis large intestine w/o perforation or abscess w/bleeding     Hypertension     Obesity     LEISA on CPAP     Osteoarthritis     Situational insomnia     Sleep apnea      Past Surgical History:   Procedure Laterality Date    [facility]    EYE SURGERY      as a child, eye extrusion    JOINT REPLACEMENT Right 2018    knee    KNEE

## 2020-06-09 ENCOUNTER — OFFICE VISIT (OUTPATIENT)
Dept: PULMONOLOGY | Age: 65
End: 2020-06-09
Payer: MEDICARE

## 2020-06-09 VITALS
WEIGHT: 223 LBS | DIASTOLIC BLOOD PRESSURE: 68 MMHG | HEART RATE: 66 BPM | OXYGEN SATURATION: 98 % | RESPIRATION RATE: 18 BRPM | SYSTOLIC BLOOD PRESSURE: 124 MMHG | BODY MASS INDEX: 33.91 KG/M2

## 2020-06-09 PROCEDURE — G8417 CALC BMI ABV UP PARAM F/U: HCPCS | Performed by: NURSE PRACTITIONER

## 2020-06-09 PROCEDURE — 4040F PNEUMOC VAC/ADMIN/RCVD: CPT | Performed by: NURSE PRACTITIONER

## 2020-06-09 PROCEDURE — 1036F TOBACCO NON-USER: CPT | Performed by: NURSE PRACTITIONER

## 2020-06-09 PROCEDURE — 1123F ACP DISCUSS/DSCN MKR DOCD: CPT | Performed by: NURSE PRACTITIONER

## 2020-06-09 PROCEDURE — 99213 OFFICE O/P EST LOW 20 MIN: CPT | Performed by: NURSE PRACTITIONER

## 2020-06-09 PROCEDURE — 3017F COLORECTAL CA SCREEN DOC REV: CPT | Performed by: NURSE PRACTITIONER

## 2020-06-09 PROCEDURE — G8427 DOCREV CUR MEDS BY ELIG CLIN: HCPCS | Performed by: NURSE PRACTITIONER

## 2020-06-09 ASSESSMENT — ENCOUNTER SYMPTOMS
GASTROINTESTINAL NEGATIVE: 1
ALLERGIC/IMMUNOLOGIC NEGATIVE: 1
RESPIRATORY NEGATIVE: 1
EYES NEGATIVE: 1

## 2020-06-09 NOTE — PROGRESS NOTES
BMI 33.91 kg/m²     Past Medical History:   Diagnosis Date    Class 3 severe obesity due to excess calories with body mass index (BMI) greater than or equal to 70 in adult [facility])     Diverticulosis large intestine w/o perforation or abscess w/bleeding     Hypertension     Obesity     LEISA on CPAP     Osteoarthritis     Situational insomnia     Sleep apnea      Past Surgical History:   Procedure Laterality Date    [facility]    EYE SURGERY      as a child, eye extrusion    JOINT REPLACEMENT Right 2018    knee    KNEE ARTHROSCOPY Left 2010        Family History   Problem Relation Age of Onset    Kidney Disease Mother         polycystic kidney    Breast Cancer Mother     Dementia Father     Cancer Brother     Brain Cancer Brother     Lung Cancer Brother     Heart Disease Brother        Social History     Socioeconomic History    Marital status:      Spouse name: Not on file    Number of children: Not on file    Years of education: Not on file    Highest education level: Not on file   Occupational History    Not on file   Social Needs    Financial resource strain: Not on file    Food insecurity     Worry: Not on file     Inability: Not on file    Transportation needs     Medical: Not on file     Non-medical: Not on file   Tobacco Use    Smoking status: Never Smoker    Smokeless tobacco: Never Used   Substance and Sexual Activity    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    Drug use: No    Sexual activity: Yes     Partners: Female   Lifestyle    Physical activity     Days per week: Not on file     Minutes per session: Not on file    Stress: Not on file   Relationships    Social connections     Talks on phone: Not on file     Gets together: Not on file     Attends Restorationism service: Not on file     Active member of club or organization: Not on file     Attends meetings of clubs or organizations: Not on file     Relationship status: Not on file   [facility] Intimate partner violence     Fear of current or ex partner: Not on file     Emotionally abused: Not on file     Physically abused: Not on file     Forced sexual activity: Not on file   Other Topics Concern    Not on file   Social History Narrative    Not on file       Review of Systems   Constitutional: Negative. HENT: Negative. Eyes: Negative. Respiratory: Negative. Cardiovascular: Negative. Gastrointestinal: Negative. Endocrine: Negative. Genitourinary: Negative. Musculoskeletal: Negative. Skin: Negative. Allergic/Immunologic: Negative. Neurological: Negative. Hematological: Negative. Psychiatric/Behavioral: Negative.         Objective:      Physical Exam  General appearance - alert, well appearing, and in no distress, oriented to person, place, and time and overweight  Mental status - alert, oriented to person, place, and time, normal mood, behavior, speech, dress, motor activity, and thought processes  Eyes - pupils equal and reactive, extraocular eye movements intact  Ears - not examined  Nose - normal and patent, no erythema, discharge or polyps  Mouth - mucous membranes moist, pharynx normal without lesions  Neck - supple, no significant adenopathy  Chest - clear to auscultation, no wheezes, rales or rhonchi, symmetric air entry  Heart -normal rate, regular rhythm, normal S1, S2, no murmurs, rubs, clicks or gallops  Abdomen - soft, nontender, nondistended, no masses or organomegaly  Neurological - alert, oriented, normal speech, no focal findings or movement disorder noted}  Extremities - peripheral pulses normal, no pedal edema, no clubbing or cyanosis  Skin - normal coloration and turgor, no rashes, no suspicious skin lesions noted     Wt Readings from Last 3 Encounters:   06/09/20 223 lb (101.2 kg)   03/16/20 228 lb (103.4 kg)   01/03/20 225 lb (102.1 kg)       Results for orders placed or performed in visit on 01/03/20   POCT glycosylated hemoglobin (Hb A1C) Result Value Ref Range    Hemoglobin A1C 6.0 %   POCT Glucose   Result Value Ref Range    Glucose 150 mg/dL    QC OK? No results found. Assessment:      1. LEISA on CPAP    2. Class 2 severe obesity due to excess calories with serious comorbidity in adult, unspecified BMI ([address]          Plan:      1. Medications reviewed, on no pulmonary medications. 2. Recommend flu vaccination in the fall annually. 3. Recommendations given regarding pneumococcal vaccinations. 4. Patient is up-to-date withvaccinations from pulmonary perspective. 5. Maintain an active lifestyle. 6. Patient's questions were answered to his satisfaction. 7. Lifelong non-smoker  8. CPAP/BiPAP to be used for at least 4 hours every night. Use humidifier if needed. Weight loss was recommended and discussed. Recommended following good sleep hygiene instructions. Sleep hygiene instructions given to the patient. Explained importance of compliance with treatment of sleep apnea. Compliance data was reviewed and the patient is compliant per insurance requirement. 9. Unfortunately his AHI remains elevated, discussed with patient auto titrating CPAP to see if adjustment in pressure settings will allow better control of his underlying residual apneas. Patient is agreeable.   New DME order completed today for auto titrating CPAP 8-16 cm H2O.  10. We'll see the patient back in 12 months        Electronically signed by REBECCA Bone CNP on 6/9/2020 at 11:35 AM

## 2020-08-04 ENCOUNTER — TELEPHONE (OUTPATIENT)
Dept: FAMILY MEDICINE CLINIC | Age: 65
End: 2020-08-04

## 2020-08-04 ENCOUNTER — OFFICE VISIT (OUTPATIENT)
Dept: FAMILY MEDICINE CLINIC | Age: 65
End: 2020-08-04
Payer: MEDICARE

## 2020-08-04 VITALS
HEIGHT: 69 IN | SYSTOLIC BLOOD PRESSURE: 132 MMHG | TEMPERATURE: 97.9 F | DIASTOLIC BLOOD PRESSURE: 80 MMHG | OXYGEN SATURATION: 99 % | HEART RATE: 60 BPM | BODY MASS INDEX: 33.03 KG/M2 | WEIGHT: 223 LBS

## 2020-08-04 PROCEDURE — 3017F COLORECTAL CA SCREEN DOC REV: CPT | Performed by: FAMILY MEDICINE

## 2020-08-04 PROCEDURE — G0402 INITIAL PREVENTIVE EXAM: HCPCS | Performed by: FAMILY MEDICINE

## 2020-08-04 PROCEDURE — 4040F PNEUMOC VAC/ADMIN/RCVD: CPT | Performed by: FAMILY MEDICINE

## 2020-08-04 PROCEDURE — 1123F ACP DISCUSS/DSCN MKR DOCD: CPT | Performed by: FAMILY MEDICINE

## 2020-08-04 ASSESSMENT — ENCOUNTER SYMPTOMS
ABDOMINAL PAIN: 0
CONSTIPATION: 0
ALLERGIC/IMMUNOLOGIC NEGATIVE: 1
EYES NEGATIVE: 1
COUGH: 0
SHORTNESS OF BREATH: 0
DIARRHEA: 0

## 2020-08-04 ASSESSMENT — PATIENT HEALTH QUESTIONNAIRE - PHQ9
SUM OF ALL RESPONSES TO PHQ QUESTIONS 1-9: 0
SUM OF ALL RESPONSES TO PHQ QUESTIONS 1-9: 0

## 2020-08-04 ASSESSMENT — LIFESTYLE VARIABLES: HOW OFTEN DO YOU HAVE A DRINK CONTAINING ALCOHOL: 0

## 2020-08-04 NOTE — LETTER
[facility], [address] [place] [address], [address] [place] [address]  [phone]    Dr. Beola Seip MD      2020      RE: Ruben FRANKLIN 1955    Dear Dr. Adolfo Ocampo was evaluated in my office for pre-operative evaluation. Based on review of information available to me at this time, the patient appears to be at no increased risk for the planned procedure. Pre op labs have been reviewed. EKG has been cleared. Current Outpatient Medications on File Prior to Visit   Medication Sig Dispense Refill    lisinopril-hydrochlorothiazide (PRINZIDE;ZESTORETIC) 20-12.5 MG per tablet TAKE ONE TABLET BY MOUTH DAILY 30 tablet 5     No current facility-administered medications on file prior to visit. Past Surgical History:   Procedure Laterality Date    [facility]    EYE SURGERY      as a child, eye extrusion    JOINT REPLACEMENT Right 2018    knee    KNEE ARTHROSCOPY Left          If you have any questions, please feel free to contact me.     Sincerely,        Dr. Beola Seip MD

## 2020-08-04 NOTE — PROGRESS NOTES
Medicare Annual Wellness Visit  Name: Ganesh Chapa Date: 2020   MRN: [id number] Sex: Male   Age: 72 y.o. Ethnicity: Non-/Non    : 1955 Race: Frances Rincon is here for Medicare AWV    Screenings for behavioral, psychosocial and functional/safety risks, and cognitive dysfunction are all negative except as indicated below. These results, as well as other patient data from the [address] form, are documented in Flowsheets linked to this Encounter. Review of Systems   Constitutional: Negative for fatigue, fever and unexpected weight change. HENT: Negative. Eyes: Negative. Respiratory: Negative for cough and shortness of breath. Cardiovascular: Negative for chest pain and leg swelling. Gastrointestinal: Negative for abdominal pain, constipation and diarrhea. Endocrine: Negative. Genitourinary: Negative for frequency and urgency. Musculoskeletal: Positive for arthralgias (oa in hands. oa left knee. total left knee ). Skin: Negative. Allergic/Immunologic: Negative. Neurological: Negative for dizziness and headaches. Hematological: Negative. Psychiatric/Behavioral: Negative for sleep disturbance. The patient is not nervous/anxious. No Known Allergies      Prior to Visit Medications    Medication Sig Taking?  Authorizing Provider   lisinopril-hydrochlorothiazide (PRINZIDE;ZESTORETIC) 20-12.5 MG per tablet TAKE ONE TABLET BY MOUTH DAILY Yes Amol Machado MD         Past Medical History:   Diagnosis Date    Class 3 severe obesity due to excess calories with body mass index (BMI) greater than or equal to 70 in adult [facility])     Diverticulosis large intestine w/o perforation or abscess w/bleeding     Hypertension     Obesity     LEISA on CPAP     Osteoarthritis     Situational insomnia     Sleep apnea        Past Surgical History:   Procedure Laterality Date    [facility]    EYE SURGERY      as motion. General: Deformity (oa changes) present. No tenderness. Lymphadenopathy:      Cervical: No cervical adenopathy. Skin:     General: Skin is warm and dry. Neurological:      Mental Status: He is alert and oriented to person, place, and time. Psychiatric:         Behavior: Behavior normal.           Patient's complete Health Risk Assessment and screening values have been reviewed and are found in Flowsheets. The following problems were reviewed today and where indicated follow up appointments were made and/or referrals ordered. Positive Risk Factor Screenings with Interventions:     Health Habits/Nutrition:  Health Habits/Nutrition  Do you exercise for at least 20 minutes 2-3 times per week?: Yes  Have you lost any weight without trying in the past 3 months?: No  Do you eat fewer than 2 meals per day?: No  Have you seen a dentist within the past year?: (!) No  Body mass index is 32.93 kg/m².   Health Habits/Nutrition Interventions:  · Dental exam overdue:  patient encouraged to make appointment with his/her dentist    Hearing/Vision:  No exam data present  Hearing/Vision  Do you or your family notice any trouble with your hearing?: (!) Yes  Do you have difficulty driving, watching TV, or doing any of your daily activities because of your eyesight?: No  Have you had an eye exam within the past year?: (!) No  Hearing/Vision Interventions:  · Hearing concerns:  patient declines any further evaluation/treatment for hearing issues  · Vision concerns:  had appt this year    Safety:  Safety  Do you have working smoke detectors?: Yes  Have all throw rugs been removed or fastened?: (!) No  Do you have non-slip mats or surfaces in all bathtubs/showers?: Yes  Do all of your stairways have a railing or banister?: Yes  Are your doorways, halls and stairs free of clutter?: Yes  Do you always fasten your seatbelt when you are in a car?: Yes  Safety Interventions:  · Home safety tips provided    Personalized Preventive Plan   Current Health Maintenance Status  Immunization History   Administered Date(s) Administered    Influenza Virus Vaccine 09/13/2019    Tdap (Boostrix, Adacel) 04/12/2010        Health Maintenance   Topic Date Due    Annual Wellness Visit (AWV)  03/16/2020    Pneumococcal 65+ years Vaccine (1 of 1 - PPSV23) 04/01/2020    DTaP/Tdap/Td vaccine (2 - Td) 04/12/2020    Potassium monitoring  07/20/2020    Creatinine monitoring  07/20/2020    Shingles Vaccine (1 of 2) 06/07/2021 (Originally 4/1/2005)    Flu vaccine (1) 09/01/2020    A1C test (Diabetic or Prediabetic)  01/03/2021    Lipid screen  07/20/2024    Colon cancer screen colonoscopy  10/30/2029    Hepatitis C screen  Completed    HIV screen  Completed    Hepatitis A vaccine  Aged Out    Hepatitis B vaccine  Aged Out    Hib vaccine  Aged Out    Meningococcal (ACWY) vaccine  Aged Out     Recommendations for [employer] Due: see orders and patient instructions/AVS.  . Recommended screening schedule for the next 5-10 years is provided to the patient in written form: see Patient Instructions/AVS.    Kaleb Francoedler was seen today for medicare awv.     Diagnoses and all orders for this visit:    Routine general medical examination at a health care facility

## 2020-08-04 NOTE — PATIENT INSTRUCTIONS
Personalized Preventive Plan for Magaly Rincon - 8/4/2020  Medicare offers a range of preventive health benefits. Some of the tests and screenings are paid in full while other may be subject to a deductible, co-insurance, and/or copay. Some of these benefits include a comprehensive review of your medical history including lifestyle, illnesses that may run in your family, and various assessments and screenings as appropriate. After reviewing your medical record and screening and assessments performed today your provider may have ordered immunizations, labs, imaging, and/or referrals for you. A list of these orders (if applicable) as well as your Preventive Care list are included within your After Visit Summary for your review. Other Preventive Recommendations:    · A preventive eye exam performed by an eye specialist is recommended every 1-2 years to screen for glaucoma; cataracts, macular degeneration, and other eye disorders. · A preventive dental visit is recommended every 6 months. · Try to get at least 150 minutes of exercise per week or 10,000 steps per day on a pedometer . · Order or download the FREE \"Exercise & Physical Activity: Your Everyday Guide\" from [employer]. Call 8-369.320.9589 or search [employer] online. · You need 3990-6745 mg of calcium and 5625-1448 IU of vitamin D per day. It is possible to meet your calcium requirement with diet alone, but a vitamin D supplement is usually necessary to meet this goal.  · When exposed to the sun, use a sunscreen that protects against both UVA and UVB radiation with an SPF of 30 or greater. Reapply every 2 to 3 hours or after sweating, drying off with a towel, or swimming. · Always wear a seat belt when traveling in a car. Always wear a helmet when riding a bicycle or motorcycle.

## 2020-09-18 ENCOUNTER — TELEPHONE (OUTPATIENT)
Dept: FAMILY MEDICINE CLINIC | Age: 65
End: 2020-09-18

## 2020-09-18 NOTE — TELEPHONE ENCOUNTER
Pt asking for a soon er appt he has 10/5/20  But he is having a prob with gas. He is not able to come on Mondays.  No openings available

## 2020-09-29 ENCOUNTER — VIRTUAL VISIT (OUTPATIENT)
Dept: PULMONOLOGY | Age: 65
End: 2020-09-29
Payer: COMMERCIAL

## 2020-09-29 PROCEDURE — G8427 DOCREV CUR MEDS BY ELIG CLIN: HCPCS | Performed by: INTERNAL MEDICINE

## 2020-09-29 PROCEDURE — 3017F COLORECTAL CA SCREEN DOC REV: CPT | Performed by: INTERNAL MEDICINE

## 2020-09-29 PROCEDURE — 4040F PNEUMOC VAC/ADMIN/RCVD: CPT | Performed by: INTERNAL MEDICINE

## 2020-09-29 PROCEDURE — 1123F ACP DISCUSS/DSCN MKR DOCD: CPT | Performed by: INTERNAL MEDICINE

## 2020-09-29 PROCEDURE — 1036F TOBACCO NON-USER: CPT | Performed by: INTERNAL MEDICINE

## 2020-09-29 PROCEDURE — 99442 PR PHYS/QHP TELEPHONE EVALUATION 11-20 MIN: CPT | Performed by: INTERNAL MEDICINE

## 2020-09-29 PROCEDURE — G8417 CALC BMI ABV UP PARAM F/U: HCPCS | Performed by: INTERNAL MEDICINE

## 2020-09-29 ASSESSMENT — SLEEP AND FATIGUE QUESTIONNAIRES
HOW LIKELY ARE YOU TO NOD OFF OR FALL ASLEEP WHILE SITTING INACTIVE IN A PUBLIC PLACE: 1
HOW LIKELY ARE YOU TO NOD OFF OR FALL ASLEEP WHILE SITTING AND READING: 1
ESS TOTAL SCORE: 5
HOW LIKELY ARE YOU TO NOD OFF OR FALL ASLEEP WHILE LYING DOWN TO REST IN THE AFTERNOON WHEN CIRCUMSTANCES PERMIT: 1
HOW LIKELY ARE YOU TO NOD OFF OR FALL ASLEEP WHILE SITTING QUIETLY AFTER LUNCH WITHOUT ALCOHOL: 0
HOW LIKELY ARE YOU TO NOD OFF OR FALL ASLEEP WHILE SITTING AND TALKING TO SOMEONE: 0
HOW LIKELY ARE YOU TO NOD OFF OR FALL ASLEEP IN A CAR, WHILE STOPPED FOR A FEW MINUTES IN TRAFFIC: 0
HOW LIKELY ARE YOU TO NOD OFF OR FALL ASLEEP WHEN YOU ARE A PASSENGER IN A CAR FOR AN HOUR WITHOUT A BREAK: 0
HOW LIKELY ARE YOU TO NOD OFF OR FALL ASLEEP WHILE WATCHING TV: 2

## 2020-09-29 ASSESSMENT — ENCOUNTER SYMPTOMS
APNEA: 1
EYES NEGATIVE: 1

## 2020-09-29 NOTE — PATIENT INSTRUCTIONS
Faxed DME order w/office note to [address] [place] [address] #[phone]. Patient declined the AVS and 1 year follow up, states will keep the appt in June w/Ruth.  Swetha Hong

## 2020-09-29 NOTE — PROGRESS NOTES
2020    TELEHEALTH EVALUATION -- Audio/Visual (During FILBG-05 public health emergency)    Patient and physician are located in their individual locations. This is visit is completed via Net[employer] application []/ Doxy. me[] / Telephone [x]     HPI:    Suleman Sigala (:  1955) has requested an audio/video evaluation for the following concern(s):    Phone call visit and follow-up for sleep apnea. Since his last visit 3 months ago he has been on auto titrating CPAP 8-16 cm water pressure. Use it for 93% of the days for an average of 7 hours and 19 minutes. Median pressure 11.2 cm water pressure. 95th percentile 14.6. Residual AHI 6.5. Patient states \"I want to go back to my old settings. \"  Does not like auto titrating CPAP. Believes that he awakens at night choking and short of breath. States \"those pressures are not high enough. \"  Previous titration 13 cm water pressure. 6 residual respiratory events. I suggested we increase his pressure to 14 cmH2O based on his most recent compliance download. Also, not using a heated humidifier. Suggested he do the same. Claims that with auto titrating CPAP is sleep is neither refreshing nor restorative. Reports some daytime sleepiness. Review of Systems   Constitutional: Negative. HENT: Negative. Eyes: Negative. Respiratory: Positive for apnea. Cardiovascular: Negative. All other systems reviewed and are negative. Prior to Visit Medications    Medication Sig Taking? Authorizing Provider   lisinopril-hydroCHLOROthiazide (PRINZIDE;ZESTORETIC) 20-12.5 MG per tablet TAKE ONE TABLET BY MOUTH DAILY Yes REBECCA Barrios - SADIA       Social History     Tobacco Use    Smoking status: Never Smoker    Smokeless tobacco: Never Used   Substance Use Topics    Alcohol use: No     Comment: sober since     Drug use: No            RECORD REVIEW: Previous medical records were reviewed at today's visit.     Wt Readings from Last 3 Encounters:   08/04/20 223 lb (101.2 kg)   06/09/20 223 lb (101.2 kg)   03/16/20 228 lb (103.4 kg)       Results for orders placed or performed in visit on 01/03/20   POCT glycosylated hemoglobin (Hb A1C)   Result Value Ref Range    Hemoglobin A1C 6.0 %   POCT Glucose   Result Value Ref Range    Glucose 150 mg/dL    QC OK? No results found. PHYSICAL EXAMINATION:  Due to this being a TeleHealth encounter, evaluation of the following organ systems is limited: Vitals/Constitutional/EENT/Resp/CV/GI//MS/Neuro/Skin/Heme-Lymph-Imm. Constitutional: [x] Appears well-developed and well-nourished. [] Abnormal  Mental status  [x] Alert and awake  [x] Oriented to person/place/time [x]Able to follow commands    [x] No apparent distress      Eyes:  EOM    [x]  Normal  [] Abnormal-  Sclera  [x]  Normal  [] Abnormal -         Discharge [x]  None visible  [] Abnormal -    HENT:   [x] Normocephalic, atraumatic. [] Abnormal shaped head   [x] Mouth/Throat: Mucous membranes are moist.     Ears [x] Normal  [] Abnormal-    Neck: [x] Normal range of motion [x] Supple [x] No visualized mass. Pulmonary/Chest: [x] Respiratory effort normal.  [x] No visualized signs of difficulty breathing or respiratory distress        [] Abnormal      Musculoskeletal:   [x] Normal range of motion. [x] Normal gait with no signs of ataxia. [x]  No signs of cyanosis of the peripheral portions of extremities. [] Abnormal       Neurological:        [x] Normal cranial nerve (limited exam to video visit) [x] No focal weakness observed       [] Abnormal          Speech       [x] Normal   [] Abnormal     Skin:        [x] No rash on visible skin  [x] Normal  [] Abnormal     Psychiatric:       [x] Normal  [] Abnormal        [x] Normal Mood  [] Anxious appearing        Other Pertinent Exam Findings:       ASSESSMENT:  1. LEISA on CPAP    2.  Class 2 severe obesity due to excess calories with serious comorbidity in adult, unspecified BMI ([facility] [address]          Plan:   1. Continue auto titrating CPAP and switch to continuous at 14 cm water pressure. 2. Discussed strategies to improve tolerance of CPAP included including heated humidification, regularizing sleep-wake schedule, and avoiding sedative hypnotics and alcohol at bedtime. 3. Weight loss. 4. Sleep with the head of the bed elevated and in the lateral decubitus position. 5. Encouraged flu shot. 6. Discussed strategies to mitigate risk of COVID-19 infection. 7. Return in 1 year. An  electronic signature was used to authenticate this note. --Rosetta JeffersDO baltazar on 9/29/2020 at 9:08 AM    9}    Pursuant to the emergency declaration under the [address] [place] [address], [facility] waiver authority and the [employer] Act, this Virtual  Visit was conducted, with patient's consent, to reduce the patient's risk of exposure to COVID-19 and provide continuity of care for an established patient. Services were provided through a video synchronous discussion virtually to substitute for in-person clinic visit.     _______________________________________________________________________________________________________________________________________________  FOR TELEPHONE VISITS PLEASE COMPLETE THE FOLLOWING      Consent:  He and/or health care decision maker is aware that that he may receive a bill for this telephone service, depending on his insurance coverage, and has provided verbal consent to proceed: Yes      I affirm this is a Patient Initiated Episode with an Established Patient who has not had a related appointment within my department in the past 7 days or scheduled within the next 24 hours.     Total Time: minutes: 11-20 minutes    Note: not billable if this call serves to triage the patient into an appointment for the relevant concern

## 2020-10-19 ENCOUNTER — OFFICE VISIT (OUTPATIENT)
Dept: FAMILY MEDICINE CLINIC | Age: 65
End: 2020-10-19
Payer: MEDICARE

## 2020-10-19 VITALS
SYSTOLIC BLOOD PRESSURE: 124 MMHG | BODY MASS INDEX: 32.85 KG/M2 | DIASTOLIC BLOOD PRESSURE: 83 MMHG | HEIGHT: 69 IN | OXYGEN SATURATION: 100 % | HEART RATE: 70 BPM | TEMPERATURE: 98 F | WEIGHT: 221.8 LBS

## 2020-10-19 LAB — HBA1C MFR BLD: 5.7 %

## 2020-10-19 PROCEDURE — G8427 DOCREV CUR MEDS BY ELIG CLIN: HCPCS | Performed by: FAMILY MEDICINE

## 2020-10-19 PROCEDURE — 3017F COLORECTAL CA SCREEN DOC REV: CPT | Performed by: FAMILY MEDICINE

## 2020-10-19 PROCEDURE — G8482 FLU IMMUNIZE ORDER/ADMIN: HCPCS | Performed by: FAMILY MEDICINE

## 2020-10-19 PROCEDURE — 99214 OFFICE O/P EST MOD 30 MIN: CPT | Performed by: FAMILY MEDICINE

## 2020-10-19 PROCEDURE — G8417 CALC BMI ABV UP PARAM F/U: HCPCS | Performed by: FAMILY MEDICINE

## 2020-10-19 PROCEDURE — 83036 HEMOGLOBIN GLYCOSYLATED A1C: CPT | Performed by: FAMILY MEDICINE

## 2020-10-19 PROCEDURE — 1123F ACP DISCUSS/DSCN MKR DOCD: CPT | Performed by: FAMILY MEDICINE

## 2020-10-19 PROCEDURE — 1036F TOBACCO NON-USER: CPT | Performed by: FAMILY MEDICINE

## 2020-10-19 PROCEDURE — 4040F PNEUMOC VAC/ADMIN/RCVD: CPT | Performed by: FAMILY MEDICINE

## 2020-10-19 RX ORDER — MELOXICAM 15 MG/1
15 TABLET ORAL DAILY
Qty: 30 TABLET | Refills: 3 | Status: SHIPPED
Start: 2020-10-19 | End: 2021-05-11 | Stop reason: ALTCHOICE

## 2020-10-19 ASSESSMENT — ENCOUNTER SYMPTOMS
CONSTIPATION: 0
COUGH: 0
DIARRHEA: 0
SHORTNESS OF BREATH: 0
ABDOMINAL PAIN: 0
ALLERGIC/IMMUNOLOGIC NEGATIVE: 1
EYES NEGATIVE: 1
BACK PAIN: 1

## 2020-10-19 NOTE — PROGRESS NOTES
[facility]  [facility]  [address]  [address]  [facility] [address]  Dept: [phone]    10/19/2020    CHIEF COMPLAINT    Chief Complaint   Patient presents with    Hypertension    Arthritis       HPI    Yaz Marie is a 72 y.o. male who presents   Chief Complaint   Patient presents with    Hypertension    Arthritis   . Increasing pain in both hands, in the finger joints. Taking aleve prn. Has had both knees replaced, the left one 8 weeks ago. Still going to PT. Hypertension   This is a chronic problem. The current episode started more than 1 year ago. The problem is controlled. Pertinent negatives include no chest pain, headaches, malaise/fatigue, peripheral edema or shortness of breath. Risk factors for coronary artery disease include male gender. Past treatments include ACE inhibitors. The current treatment provides moderate improvement. There are no compliance problems. Vitals:    10/19/20 1132   BP: 124/83   Site: Left Upper Arm   Position: Sitting   Cuff Size: Medium Adult   Pulse: 70   Temp: 98 °F (36.7 °C)   TempSrc: Temporal   SpO2: 100%   Weight: 221 lb 12.8 oz (100.6 kg)   Height: 5' 9\" (1.753 m)       REVIEW OF SYSTEMS    Review of Systems   Constitutional: Negative for fatigue, fever, malaise/fatigue and unexpected weight change. HENT: Negative. Eyes: Negative. Respiratory: Negative for cough and shortness of breath. Cardiovascular: Negative for chest pain and leg swelling. Gastrointestinal: Negative for abdominal pain, constipation and diarrhea. Endocrine: Negative. Genitourinary: Negative for frequency and urgency. Musculoskeletal: Positive for arthralgias (getting worse in his hands) and back pain (recurrent strain, wants to see chiropractor). Skin: Negative. Allergic/Immunologic: Negative. Neurological: Negative for dizziness and headaches. Hematological: Negative.     Psychiatric/Behavioral: Negative for sleep disturbance. The patient is not nervous/anxious.         PAST MEDICAL HISTORY    Past Medical History:   Diagnosis Date    Class 3 severe obesity due to excess calories with body mass index (BMI) greater than or equal to 70 in adult [facility])     Diverticulosis large intestine w/o perforation or abscess w/bleeding     Hypertension     Obesity     LEISA on CPAP     Osteoarthritis     Situational insomnia     Sleep apnea        FAMILY HISTORY    Family History   Problem Relation Age of Onset    Kidney Disease Mother         polycystic kidney    Breast Cancer Mother     Dementia Father     Cancer Brother     Brain Cancer Brother     Lung Cancer Brother     Heart Disease Brother        SOCIAL HISTORY    Social History     Socioeconomic History    Marital status:      Spouse name: None    Number of children: None    Years of education: None    Highest education level: None   Occupational History    None   Social Needs    Financial resource strain: None    Food insecurity     Worry: None     Inability: None    Transportation needs     Medical: None     Non-medical: None   Tobacco Use    Smoking status: Never Smoker    Smokeless tobacco: Never Used   Substance and Sexual Activity    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    Drug use: No    Sexual activity: Yes     Partners: Female   Lifestyle    Physical activity     Days per week: None     Minutes per session: None    Stress: None   Relationships    Social connections     Talks on phone: None     Gets together: None     Attends Hinduism service: None     Active member of club or organization: None     Attends meetings of clubs or organizations: None     Relationship status: None    Intimate partner violence     Fear of current or ex partner: None     Emotionally abused: None     Physically abused: None     Forced sexual activity: None   Other Topics Concern    None   Social History Narrative    None SURGICAL HISTORY    Past Surgical History:   Procedure Laterality Date    [facility]    EYE SURGERY      as a child, eye extrusion    JOINT REPLACEMENT Right 2018    knee    KNEE ARTHROSCOPY Left 2010       CURRENT MEDICATIONS    Current Outpatient Medications   Medication Sig Dispense Refill    meloxicam (MOBIC) 15 MG tablet Take 1 tablet by mouth daily Take with food 30 tablet 3    lisinopril-hydroCHLOROthiazide (PRINZIDE;ZESTORETIC) 20-12.5 MG per tablet TAKE ONE TABLET BY MOUTH DAILY 30 tablet 5     No current facility-administered medications for this visit. ALLERGIES    No Known Allergies    PHYSICAL EXAM   Physical Exam  Vitals signs reviewed. Constitutional:       Appearance: He is well-developed. HENT:      Head: Normocephalic. Eyes:      Conjunctiva/sclera: Conjunctivae normal.      Pupils: Pupils are equal, round, and reactive to light. Neck:      Musculoskeletal: Normal range of motion and neck supple. Thyroid: No thyromegaly. Cardiovascular:      Rate and Rhythm: Normal rate and regular rhythm. Heart sounds: Normal heart sounds. No murmur. Pulmonary:      Effort: Pulmonary effort is normal.      Breath sounds: Normal breath sounds. No wheezing or rales. Abdominal:      Palpations: Abdomen is soft. Tenderness: There is no abdominal tenderness. There is no guarding or rebound. Musculoskeletal: Normal range of motion. General: Deformity (oa changes of fingers) present. No tenderness. Lymphadenopathy:      Cervical: No cervical adenopathy. Skin:     General: Skin is warm and dry. Neurological:      Mental Status: He is alert and oriented to person, place, and time. Psychiatric:         Mood and Affect: Mood normal.         Behavior: Behavior normal.         Thought Content: Thought content normal.         Judgment: Judgment normal.         Assessment/PLan  1. Primary osteoarthritis of both hands  Chronic condition.  Trial of mobic, advised not to take nsaid with it.  - meloxicam (MOBIC) 15 MG tablet; Take 1 tablet by mouth daily Take with food  Dispense: 30 tablet; Refill: 3    2. Essential hypertension  Chronic, stable, continue current medication and/or plan      3. Pre-diabetes  A1C normal. Cont to follow low carb diet. Byron Guthrie received counseling on the following healthy behaviors: nutrition, exercise and medication adherence  Reviewed prior labs and health maintenance. Continue current medications, diet and exercise. Discussed use, benefit, and side effects of prescribed medications. Barriers to medication compliance addressed. Patient given educational materials - see patient instructions. All patient questions answered. Patient voiced understanding. Return in about 6 months (around 4/19/2021) for htn.         Electronically signed by Elvira Tong MD on 10/19/20 at 11:39 AM EDT

## 2020-11-20 ENCOUNTER — TELEPHONE (OUTPATIENT)
Dept: FAMILY MEDICINE CLINIC | Age: 65
End: 2020-11-20

## 2020-11-20 ENCOUNTER — NURSE ONLY (OUTPATIENT)
Dept: PRIMARY CARE CLINIC | Age: 65
End: 2020-11-20
Payer: MEDICARE

## 2020-11-20 ENCOUNTER — HOSPITAL ENCOUNTER (OUTPATIENT)
Age: 65
Setting detail: SPECIMEN
Discharge: HOME | End: 2020-11-20
Payer: MEDICARE

## 2020-11-20 PROCEDURE — 99211 OFF/OP EST MAY X REQ PHY/QHP: CPT | Performed by: NURSE PRACTITIONER

## 2020-11-20 NOTE — PROGRESS NOTES
Pt presented with order from PCP for COVID-19 testing. Sample collected by Sona Reynolds MA and sent to the lab.

## 2020-11-20 NOTE — TELEPHONE ENCOUNTER
Pt asking for covid testing:  No symptoms. But, his wife has a sore throat. [facility] this afternoon.

## 2020-11-25 LAB — SARS-COV-2, NAA: NOT DETECTED

## 2020-11-26 ENCOUNTER — TELEPHONE (OUTPATIENT)
Dept: FAMILY MEDICINE CLINIC | Age: 65
End: 2020-11-26

## 2020-11-27 ENCOUNTER — TELEPHONE (OUTPATIENT)
Dept: FAMILY MEDICINE CLINIC | Age: 65
End: 2020-11-27

## 2020-11-27 NOTE — TELEPHONE ENCOUNTER
Pt stated we was mistakenly taking his meloxicam as his BP med for about 5 days and taking co Q 10 for meloxicam pt stated he spoke to Nurse last night and was advised to take another half tablet of his lisinopril pt BP has been 150/88, 161/93,136/93 and this morning BP was 168/83 pt is asking what he can do to get his BP lowered

## 2020-11-27 NOTE — TELEPHONE ENCOUNTER
Received after hours page from patient at 6:20 PM regarding elevated BP of 163/95. Reporting other BP readings of 161/93 and 150/88 earlier today. After speaking to patient he indicates that he realized that he was taking his Mobic instead of his Lisinpril HCTZ x 5 days and has only resumed his BP medication yesterday. Patient instructed to take an additional 1/2 tablet of his Lisinopril/HCTZ, monitor BP and notify office of additional elevations. Discussed that medication may take a couple more days to reduce BP back to normal range.

## 2020-11-27 NOTE — TELEPHONE ENCOUNTER
Double up on the lisinopril-hydrochlorothiazide for few days and continue to check blood pressure when it seems to be coming back to normal then he can drop back down to 1 a day

## 2020-12-18 ENCOUNTER — OFFICE VISIT (OUTPATIENT)
Dept: FAMILY MEDICINE CLINIC | Age: 65
End: 2020-12-18
Payer: MEDICARE

## 2020-12-18 VITALS
SYSTOLIC BLOOD PRESSURE: 130 MMHG | HEART RATE: 66 BPM | DIASTOLIC BLOOD PRESSURE: 68 MMHG | BODY MASS INDEX: 33.3 KG/M2 | OXYGEN SATURATION: 98 % | WEIGHT: 224.8 LBS | TEMPERATURE: 97.2 F | HEIGHT: 69 IN

## 2020-12-18 PROCEDURE — 1123F ACP DISCUSS/DSCN MKR DOCD: CPT | Performed by: FAMILY MEDICINE

## 2020-12-18 PROCEDURE — G8417 CALC BMI ABV UP PARAM F/U: HCPCS | Performed by: FAMILY MEDICINE

## 2020-12-18 PROCEDURE — 99214 OFFICE O/P EST MOD 30 MIN: CPT | Performed by: FAMILY MEDICINE

## 2020-12-18 PROCEDURE — 1036F TOBACCO NON-USER: CPT | Performed by: FAMILY MEDICINE

## 2020-12-18 PROCEDURE — 3017F COLORECTAL CA SCREEN DOC REV: CPT | Performed by: FAMILY MEDICINE

## 2020-12-18 PROCEDURE — G8482 FLU IMMUNIZE ORDER/ADMIN: HCPCS | Performed by: FAMILY MEDICINE

## 2020-12-18 PROCEDURE — G8427 DOCREV CUR MEDS BY ELIG CLIN: HCPCS | Performed by: FAMILY MEDICINE

## 2020-12-18 PROCEDURE — 4040F PNEUMOC VAC/ADMIN/RCVD: CPT | Performed by: FAMILY MEDICINE

## 2020-12-18 RX ORDER — DOCUSATE SODIUM 100 MG/1
100 CAPSULE, LIQUID FILLED ORAL 2 TIMES DAILY
COMMUNITY
End: 2021-05-11 | Stop reason: ALTCHOICE

## 2020-12-18 RX ORDER — AMLODIPINE BESYLATE 5 MG/1
TABLET ORAL
COMMUNITY
Start: 2020-11-25 | End: 2021-05-11 | Stop reason: SDUPTHER

## 2020-12-18 SDOH — ECONOMIC STABILITY: FOOD INSECURITY: WITHIN THE PAST 12 MONTHS, THE FOOD YOU BOUGHT JUST DIDN'T LAST AND YOU DIDN'T HAVE MONEY TO GET MORE.: NEVER TRUE

## 2020-12-18 SDOH — ECONOMIC STABILITY: FOOD INSECURITY: WITHIN THE PAST 12 MONTHS, YOU WORRIED THAT YOUR FOOD WOULD RUN OUT BEFORE YOU GOT MONEY TO BUY MORE.: NEVER TRUE

## 2020-12-18 SDOH — ECONOMIC STABILITY: TRANSPORTATION INSECURITY
IN THE PAST 12 MONTHS, HAS LACK OF TRANSPORTATION KEPT YOU FROM MEETINGS, WORK, OR FROM GETTING THINGS NEEDED FOR DAILY LIVING?: NO

## 2020-12-18 SDOH — ECONOMIC STABILITY: INCOME INSECURITY: HOW HARD IS IT FOR YOU TO PAY FOR THE VERY BASICS LIKE FOOD, HOUSING, MEDICAL CARE, AND HEATING?: NOT HARD AT ALL

## 2020-12-18 SDOH — ECONOMIC STABILITY: TRANSPORTATION INSECURITY
IN THE PAST 12 MONTHS, HAS THE LACK OF TRANSPORTATION KEPT YOU FROM MEDICAL APPOINTMENTS OR FROM GETTING MEDICATIONS?: NO

## 2020-12-18 ASSESSMENT — ENCOUNTER SYMPTOMS
ALLERGIC/IMMUNOLOGIC NEGATIVE: 1
EYES NEGATIVE: 1
SHORTNESS OF BREATH: 0
DIARRHEA: 0
COUGH: 0
CONSTIPATION: 0
ABDOMINAL PAIN: 0
BLOOD IN STOOL: 0

## 2020-12-18 NOTE — PROGRESS NOTES
Neurological: Negative for dizziness and headaches. Hematological: Negative. Psychiatric/Behavioral: Negative for sleep disturbance. The patient is not nervous/anxious.         PAST MEDICAL HISTORY    Past Medical History:   Diagnosis Date    Class 3 severe obesity due to excess calories with body mass index (BMI) greater than or equal to 70 in adult [facility])     Diverticulosis large intestine w/o perforation or abscess w/bleeding     Hypertension     Obesity     LEISA on CPAP     Osteoarthritis     Situational insomnia     Sleep apnea        FAMILY HISTORY    Family History   Problem Relation Age of Onset    Kidney Disease Mother         polycystic kidney    Breast Cancer Mother     Dementia Father     Cancer Brother     Brain Cancer Brother     Lung Cancer Brother     Heart Disease Brother        SOCIAL HISTORY    Social History     Socioeconomic History    Marital status:      Spouse name: None    Number of children: None    Years of education: None    Highest education level: None   Occupational History    None   Social Needs    Financial resource strain: Not hard at all   [place] insecurity     Worry: Never true     Inability: Never true    Transportation needs     Medical: No     Non-medical: No   Tobacco Use    Smoking status: Never Smoker    Smokeless tobacco: Never Used   Substance and Sexual Activity    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    Drug use: No    Sexual activity: Yes     Partners: Female   Lifestyle    Physical activity     Days per week: None     Minutes per session: None    Stress: None   Relationships    Social connections     Talks on phone: None     Gets together: None     Attends Scientology service: None     Active member of club or organization: None     Attends meetings of clubs or organizations: None     Relationship status: None    Intimate partner violence     Fear of current or ex partner: None     Emotionally abused: None

## 2020-12-21 ENCOUNTER — TELEPHONE (OUTPATIENT)
Dept: FAMILY MEDICINE CLINIC | Age: 65
End: 2020-12-21

## 2020-12-31 ENCOUNTER — HOSPITAL ENCOUNTER (OUTPATIENT)
Age: 65
Discharge: HOME | End: 2020-12-31
Payer: MEDICARE

## 2020-12-31 DIAGNOSIS — E78.6 LOW HDL (UNDER 40): ICD-10-CM

## 2020-12-31 DIAGNOSIS — R73.03 PRE-DIABETES: ICD-10-CM

## 2020-12-31 DIAGNOSIS — I10 ESSENTIAL HYPERTENSION: ICD-10-CM

## 2020-12-31 LAB
ALBUMIN SERPL-MCNC: 4.1 G/DL (ref 3.5–5.2)
ALBUMIN/GLOBULIN RATIO: 1.5 (ref 1–2.5)
ALP BLD-CCNC: 86 U/L (ref 40–129)
ALT SERPL-CCNC: 21 U/L (ref 5–41)
ANION GAP SERPL CALCULATED.3IONS-SCNC: 11 MMOL/L (ref 9–17)
AST SERPL-CCNC: 18 U/L
BILIRUB SERPL-MCNC: 0.56 MG/DL (ref 0.3–1.2)
BUN BLDV-MCNC: 12 MG/DL (ref 8–23)
BUN/CREAT BLD: ABNORMAL (ref 9–20)
CALCIUM SERPL-MCNC: 9.3 MG/DL (ref 8.6–10.4)
CHLORIDE BLD-SCNC: 106 MMOL/L (ref 98–107)
CHOLESTEROL/HDL RATIO: 5.2
CHOLESTEROL: 178 MG/DL
CO2: 28 MMOL/L (ref 20–31)
CREAT SERPL-MCNC: 0.93 MG/DL (ref 0.7–1.2)
GFR AFRICAN AMERICAN: >60 ML/MIN
GFR NON-AFRICAN AMERICAN: >60 ML/MIN
GFR SERPL CREATININE-BSD FRML MDRD: ABNORMAL ML/MIN/{1.73_M2}
GFR SERPL CREATININE-BSD FRML MDRD: ABNORMAL ML/MIN/{1.73_M2}
GLUCOSE BLD-MCNC: 110 MG/DL (ref 70–99)
HDLC SERPL-MCNC: 34 MG/DL
LDL CHOLESTEROL: 109 MG/DL (ref 0–130)
POTASSIUM SERPL-SCNC: 4.6 MMOL/L (ref 3.7–5.3)
SODIUM BLD-SCNC: 145 MMOL/L (ref 135–144)
TOTAL PROTEIN: 6.8 G/DL (ref 6.4–8.3)
TRIGL SERPL-MCNC: 173 MG/DL
TSH SERPL DL<=0.05 MIU/L-ACNC: 2.01 MIU/L (ref 0.3–5)
VLDLC SERPL CALC-MCNC: ABNORMAL MG/DL (ref 1–30)

## 2020-12-31 PROCEDURE — 84443 ASSAY THYROID STIM HORMONE: CPT

## 2020-12-31 PROCEDURE — 80053 COMPREHEN METABOLIC PANEL: CPT

## 2020-12-31 PROCEDURE — 36415 COLL VENOUS BLD VENIPUNCTURE: CPT

## 2020-12-31 PROCEDURE — 80061 LIPID PANEL: CPT

## 2021-03-10 DIAGNOSIS — I10 ESSENTIAL HYPERTENSION: ICD-10-CM

## 2021-03-10 RX ORDER — LISINOPRIL AND HYDROCHLOROTHIAZIDE 20; 12.5 MG/1; MG/1
TABLET ORAL
Qty: 90 TABLET | Refills: 4 | Status: SHIPPED | OUTPATIENT
Start: 2021-03-10 | End: 2022-06-06

## 2021-05-11 ENCOUNTER — OFFICE VISIT (OUTPATIENT)
Dept: FAMILY MEDICINE CLINIC | Age: 66
End: 2021-05-11
Payer: MEDICARE

## 2021-05-11 VITALS
HEIGHT: 69 IN | SYSTOLIC BLOOD PRESSURE: 120 MMHG | DIASTOLIC BLOOD PRESSURE: 60 MMHG | BODY MASS INDEX: 32.85 KG/M2 | OXYGEN SATURATION: 99 % | HEART RATE: 88 BPM | WEIGHT: 221.8 LBS | TEMPERATURE: 97.9 F

## 2021-05-11 DIAGNOSIS — Z99.89 OSA ON CPAP: ICD-10-CM

## 2021-05-11 DIAGNOSIS — G47.33 OSA ON CPAP: ICD-10-CM

## 2021-05-11 DIAGNOSIS — Z00.00 ANNUAL PHYSICAL EXAM: ICD-10-CM

## 2021-05-11 DIAGNOSIS — M15.9 PRIMARY OSTEOARTHRITIS INVOLVING MULTIPLE JOINTS: ICD-10-CM

## 2021-05-11 DIAGNOSIS — I10 ESSENTIAL HYPERTENSION: Primary | ICD-10-CM

## 2021-05-11 PROCEDURE — 99213 OFFICE O/P EST LOW 20 MIN: CPT | Performed by: FAMILY MEDICINE

## 2021-05-11 RX ORDER — LANOLIN ALCOHOL/MO/W.PET/CERES
1 CREAM (GRAM) TOPICAL DAILY
COMMUNITY

## 2021-05-11 RX ORDER — LANOLIN ALCOHOL/MO/W.PET/CERES
2500 CREAM (GRAM) TOPICAL 2 TIMES DAILY
COMMUNITY

## 2021-05-11 RX ORDER — LANOLIN ALCOHOL/MO/W.PET/CERES
50 CREAM (GRAM) TOPICAL DAILY
COMMUNITY

## 2021-05-11 RX ORDER — ASCORBIC ACID 1000 MG
300 TABLET ORAL DAILY
COMMUNITY

## 2021-05-11 RX ORDER — AMLODIPINE BESYLATE 5 MG/1
5 TABLET ORAL DAILY
Qty: 90 TABLET | Refills: 3 | Status: SHIPPED | OUTPATIENT
Start: 2021-05-11 | End: 2022-04-04 | Stop reason: ALTCHOICE

## 2021-05-11 RX ORDER — VITAMIN E 268 MG
400 CAPSULE ORAL DAILY
COMMUNITY

## 2021-05-11 ASSESSMENT — PATIENT HEALTH QUESTIONNAIRE - PHQ9
SUM OF ALL RESPONSES TO PHQ QUESTIONS 1-9: 0
SUM OF ALL RESPONSES TO PHQ QUESTIONS 1-9: 0

## 2021-05-11 ASSESSMENT — ENCOUNTER SYMPTOMS
BLOOD IN STOOL: 0
ABDOMINAL PAIN: 0
SHORTNESS OF BREATH: 0
EYES NEGATIVE: 1
COUGH: 0
ALLERGIC/IMMUNOLOGIC NEGATIVE: 1
CONSTIPATION: 0
DIARRHEA: 0

## 2021-05-11 NOTE — PROGRESS NOTES
[facility]  [facility]  [address]  [address]  [address] [address]  Dept: [phone]    7/1/2021    CHIEF COMPLAINT    Chief Complaint   Patient presents with    Annual Exam       THAO Henry is a 77 y.o. male who presents   Chief Complaint   Patient presents with    Annual Exam   .  Physical exam. Retired from sheet metal work. Hx of cold feet, seeing podiatrist who has ordered an arterial scan. Recurrent back pain and shoulder pain, worse if overusing. Hx of bilat knee replacements. Takes aleve prn for OA pain. Taking medications as prescribed for hypertension with good effectiveness and no side effects. Taking supplements of folate and vitamin B. History of osteoarthritis. Improved since he is not working at a physical job. Vitals:    05/11/21 1423   BP: 120/60   Pulse: 88   Temp: 97.9 °F (36.6 °C)   SpO2: 99%   Weight: 221 lb 12.8 oz (100.6 kg)   Height: 5' 9\" (1.753 m)       REVIEW OF SYSTEMS    Review of Systems   Constitutional: Negative for fatigue, fever and unexpected weight change. HENT: Negative. Eyes: Negative. Respiratory: Negative for cough and shortness of breath. Cardiovascular: Negative for chest pain and leg swelling. Bilat cold feet, seeing podiatrist   Gastrointestinal: Negative for abdominal pain, blood in stool, constipation and diarrhea. Endocrine: Negative. Genitourinary: Negative for frequency and urgency. Musculoskeletal: Positive for arthralgias (oa changes). Skin: Negative. Allergic/Immunologic: Negative. Neurological: Negative for dizziness and headaches. Hematological: Negative. Psychiatric/Behavioral: Negative for sleep disturbance. The patient is not nervous/anxious.         PAST MEDICAL HISTORY    Past Medical History:   Diagnosis Date    Class 3 severe obesity due to excess calories with body mass index (BMI) greater than or equal to 70 in adult [facility])     Diverticulosis large intestine w/o perforation or abscess w/bleeding     Hypertension     Obesity     LEISA on CPAP     Osteoarthritis     Situational insomnia     Sleep apnea        FAMILY HISTORY    Family History   Problem Relation Age of Onset    Kidney Disease Mother         polycystic kidney    Breast Cancer Mother     Dementia Father     Cancer Brother     Brain Cancer Brother     Lung Cancer Brother     Heart Disease Brother        SOCIAL HISTORY    Social History     Socioeconomic History    Marital status:      Spouse name: None    Number of children: 3    Years of education: None    Highest education level: None   Occupational History    None   Tobacco Use    Smoking status: Never Smoker    Smokeless tobacco: Never Used   Vaping Use    Vaping Use: Never used   Substance and Sexual Activity    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    Drug use: No    Sexual activity: Yes     Partners: Female   Other Topics Concern    None   Social History Narrative    None     Social Determinants of Health     Financial Resource Strain: Low Risk     Difficulty of Paying Living Expenses: Not hard at all   Food Insecurity: No Food Insecurity    Worried About Running Out of Food in the Last Year: Never true    Katelynn of Food in the Last Year: Never true   Transportation Needs: No Transportation Needs    Lack of Transportation (Medical): No    Lack of Transportation (Non-Medical):  No   Physical Activity:     Days of Exercise per Week:     Minutes of Exercise per Session:    Stress:     Feeling of Stress :    Social Connections:     Frequency of Communication with Friends and Family:     Frequency of Social Gatherings with Friends and Family:     Attends Taoism Services:     Active Member of Clubs or Organizations:     Attends Club or Organization Meetings:     Marital Status:    Intimate Partner Violence:     Fear of Current or Ex-Partner:     Emotionally Abused:     dry.   Neurological:      Mental Status: He is alert and oriented to person, place, and time. Psychiatric:         Mood and Affect: Mood normal.         Behavior: Behavior normal.         Thought Content: Thought content normal.         Judgment: Judgment normal.         ASSESSMENT/PLAN      1. Essential hypertension  Chronic, stable, continue current medication and/or plan  Stay on amlodipine 5mg. 2. Primary osteoarthritis involving multiple joints  Stay active, prn aleve. 3. Annual physical exam  Cont healthy lifestyle and nutrition. Follow up with podiatrist for circulatory issues. 4. LEISA on CPAP  Chronic, stable. Lashawn Dennis received counseling on the following healthy behaviors: nutrition, exercise and medication adherence  Reviewed prior labs and health maintenance. Continue current medications, diet and exercise. Discussed use, benefit, and side effects of prescribed medications. Barriers to medication compliance addressed. Patient given educational materials - see patient instructions. All patient questions answered. Patient voiced understanding. Return in about 6 months (around 11/11/2021) for htn.         Electronically signed by Jim Copeland MD on 5/11/21 at 2:29 PM EDT

## 2021-06-01 ENCOUNTER — OFFICE VISIT (OUTPATIENT)
Dept: PULMONOLOGY | Age: 66
End: 2021-06-01
Payer: MEDICARE

## 2021-06-01 VITALS
OXYGEN SATURATION: 98 % | WEIGHT: 225 LBS | HEIGHT: 69 IN | BODY MASS INDEX: 33.33 KG/M2 | TEMPERATURE: 98.9 F | HEART RATE: 60 BPM | RESPIRATION RATE: 18 BRPM | SYSTOLIC BLOOD PRESSURE: 119 MMHG | DIASTOLIC BLOOD PRESSURE: 72 MMHG

## 2021-06-01 DIAGNOSIS — G47.33 OSA ON CPAP: Primary | ICD-10-CM

## 2021-06-01 DIAGNOSIS — Z99.89 OSA ON CPAP: Primary | ICD-10-CM

## 2021-06-01 DIAGNOSIS — E66.01 CLASS 2 SEVERE OBESITY DUE TO EXCESS CALORIES WITH SERIOUS COMORBIDITY IN ADULT, UNSPECIFIED BMI (HCC): ICD-10-CM

## 2021-06-01 PROCEDURE — 3017F COLORECTAL CA SCREEN DOC REV: CPT | Performed by: NURSE PRACTITIONER

## 2021-06-01 PROCEDURE — G8417 CALC BMI ABV UP PARAM F/U: HCPCS | Performed by: NURSE PRACTITIONER

## 2021-06-01 PROCEDURE — 4040F PNEUMOC VAC/ADMIN/RCVD: CPT | Performed by: NURSE PRACTITIONER

## 2021-06-01 PROCEDURE — 1036F TOBACCO NON-USER: CPT | Performed by: NURSE PRACTITIONER

## 2021-06-01 PROCEDURE — 1123F ACP DISCUSS/DSCN MKR DOCD: CPT | Performed by: NURSE PRACTITIONER

## 2021-06-01 PROCEDURE — G8427 DOCREV CUR MEDS BY ELIG CLIN: HCPCS | Performed by: NURSE PRACTITIONER

## 2021-06-01 PROCEDURE — 99213 OFFICE O/P EST LOW 20 MIN: CPT | Performed by: NURSE PRACTITIONER

## 2021-06-01 ASSESSMENT — ENCOUNTER SYMPTOMS
ALLERGIC/IMMUNOLOGIC NEGATIVE: 1
EYES NEGATIVE: 1
GASTROINTESTINAL NEGATIVE: 1
RESPIRATORY NEGATIVE: 1

## 2021-06-01 NOTE — PROGRESS NOTES
hypopneas. Apnea hypopnea index for the entire night was 66.3. AHI was 66.6 during NREM sleep compared to an AHI of 64.5 during REM sleep. Patient had a lowest SPO2 of 72%. Sleep study 3/16/2004: Patient had a total of 107 respiratory events, 94 were partial, 13 complete. Apnea hypopnea index was 14.9 events per hour. Lowest SPO2 saturation was 82.9%.     Laboratory Evaluation:    Immunizations:   Immunization History   Administered Date(s) Administered    Influenza Virus Vaccine 09/13/2019, 09/25/2020    Influenza, Quadv, IM, PF (6 mo and older Fluzone, Flulaval, Fluarix, and 3 yrs and older Afluria) 09/25/2020    Influenza, Eric Lilliam, Recombinant, IM PF (Flublok 18 yrs and older) 09/13/2019    Pneumococcal Conjugate 13-valent (Rip Patience) 09/25/2020    Tdap (Boostrix, Adacel) 02/01/2010, 04/12/2010    Zoster Recombinant (Shingrix) 12/08/2020        Sleep Medicine 9/29/2020 11/27/2019 4/18/2019 12/6/2018 10/9/2018   Sitting and reading 1 2 2 - 3   Watching TV 2 1 2 - 3   Sitting, inactive in a public place (e.g. a theatre or a meeting) 1 1 0 - 0   As a passenger in a car for an hour without a break 0 0 0 - 3   Lying down to rest in the afternoon when circumstances permit 1 3 3 - 3   Sitting and talking to someone 0 0 0 - 0   Sitting quietly after a lunch without alcohol 0 1 0 - 2   In a car, while stopped for a few minutes in traffic 0 0 0 - 0   Total score 5 8 7 - 14   Neck circumference - - - 17.5 -       /72 (Site: Left Upper Arm, Position: Sitting, Cuff Size: Medium Adult)   Pulse 60   Temp 98.9 °F (37.2 °C) (Temporal)   Resp 18   Ht 5' 9\" (1.753 m)   Wt 225 lb (102.1 kg)   SpO2 98% Comment: room air at rest  BMI 33.23 kg/m²     Past Medical History:   Diagnosis Date    Class 3 severe obesity due to excess calories with body mass index (BMI) greater than or equal to 70 in adult [facility])     Diverticulosis large intestine w/o perforation or abscess w/bleeding     Hypertension     Obesity  LEISA on CPAP     Osteoarthritis     Situational insomnia     Sleep apnea      Past Surgical History:   Procedure Laterality Date    [facility]    EYE SURGERY      as a child, eye extrusion    JOINT REPLACEMENT Right 2018    knee    KNEE ARTHROSCOPY Left 2010     Family History   Problem Relation Age of Onset    Kidney Disease Mother         polycystic kidney    Breast Cancer Mother     Dementia Father     Cancer Brother     Brain Cancer Brother     Lung Cancer Brother     Heart Disease Brother        Social History     Socioeconomic History    Marital status:      Spouse name: Not on file    Number of children: 3    Years of education: Not on file    Highest education level: Not on file   Occupational History    Not on file   Tobacco Use    Smoking status: Never Smoker    Smokeless tobacco: Never Used   Vaping Use    Vaping Use: Never used   Substance and Sexual Activity    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    Drug use: No    Sexual activity: Yes     Partners: Female   Other Topics Concern    Not on file   Social History Narrative    Not on file     Social Determinants of Health     Financial Resource Strain: Low Risk     Difficulty of Paying Living Expenses: Not hard at all   Food Insecurity: No Food Insecurity    Worried About Running Out of Food in the Last Year: Never true    [address] in the Last Year: Never true   Transportation Needs: No Transportation Needs    Lack of Transportation (Medical): No    Lack of Transportation (Non-Medical):  No   Physical Activity:     Days of Exercise per Week:     Minutes of Exercise per Session:    Stress:     Feeling of Stress :    Social Connections:     Frequency of Communication with Friends and Family:     Frequency of Social Gatherings with Friends and Family:     Attends [employer] Services:     Active Member of Clubs or Organizations:     Attends Club or Organization Meetings:    Nain Garcia Marital Status:    Intimate Partner Violence:     Fear of Current or Ex-Partner:     Emotionally Abused:     Physically Abused:     Sexually Abused:        Review of Systems   Constitutional: Negative. HENT: Negative. Eyes: Negative. Respiratory: Negative. Cardiovascular: Negative. Gastrointestinal: Negative. Endocrine: Negative. Genitourinary: Negative. Musculoskeletal: Negative. Skin: Negative. Allergic/Immunologic: Negative. Neurological: Negative. Hematological: Negative. Psychiatric/Behavioral: Negative.         Objective:       Physical Exam  General appearance - alert, well appearing, and in no distress, oriented to person, place, and time and normal appearing weight  Mental status - alert, oriented to person, place, and time, normal mood, behavior, speech, dress, motor activity, and thought processes  Eyes - pupils equal and reactive, extraocular eye movements intact  Ears - not examined  Nose - normal and patent, no erythema, discharge or polyps  Mouth - mucous membranes moist, pharynx normal without lesions  Neck - supple, no significant adenopathy  Chest - clear to auscultation, no wheezes, rales or rhonchi, symmetric air entry  Heart -normal rate, regular rhythm, normal S1, S2, no murmurs, rubs, clicks or gallops  Abdomen - soft, nontender, nondistended, no masses or organomegaly  Neuro- alert, oriented, normal speech, no focal findings or movement disorder noted}  Extremities - peripheral pulses normal, no pedal edema, no clubbing or cyanosis  Skin - normal coloration and turgor, no rashes, no suspicious skin lesions noted     Wt Readings from Last 3 Encounters:   06/01/21 225 lb (102.1 kg)   05/11/21 221 lb 12.8 oz (100.6 kg)   12/18/20 224 lb 12.8 oz (102 kg)       Results for orders placed or performed during the hospital encounter of 12/31/20   TSH with Reflex   Result Value Ref Range    TSH 2.01 0.30 - 5.00 mIU/L   Lipid Panel   Result Value Ref Range Cholesterol 178 <200 mg/dL    HDL 34 (L) >40 mg/dL    LDL Cholesterol 109 0 - 130 mg/dL    Chol/HDL Ratio 5.2 (H) <5    Triglycerides 173 (H) <150 mg/dL    VLDL NOT REPORTED (H) 1 - 30 mg/dL   Comprehensive Metabolic Panel   Result Value Ref Range    Glucose 110 (H) 70 - 99 mg/dL    BUN 12 8 - 23 mg/dL    CREATININE 0.93 0.70 - 1.20 mg/dL    Bun/Cre Ratio NOT REPORTED 9 - 20    Calcium 9.3 8.6 - 10.4 mg/dL    Sodium 145 (H) 135 - 144 mmol/L    Potassium 4.6 3.7 - 5.3 mmol/L    Chloride 106 98 - 107 mmol/L    CO2 28 20 - 31 mmol/L    Anion Gap 11 9 - 17 mmol/L    Alkaline Phosphatase 86 40 - 129 U/L    ALT 21 5 - 41 U/L    AST 18 <40 U/L    Total Bilirubin 0.56 0.3 - 1.2 mg/dL    Total Protein 6.8 6.4 - 8.3 g/dL    Albumin 4.1 3.5 - 5.2 g/dL    Albumin/Globulin Ratio 1.5 1.0 - 2.5    GFR Non-African American >60 >60 mL/min    GFR African American >60 >60 mL/min    GFR Comment          GFR Staging NOT REPORTED        No results found. Assessment:      1. LEISA on CPAP    2. Class 2 severe obesity due to excess calories with serious comorbidity in adult, unspecified BMI ([facility]    3. BMI 33.0-33.9,adult          Plan:      1. No pulmonary medication  2. Educated and clarified the medication use. 3. Recommend flu vaccination in the fall annually. Due in fall  4. Patient is up-to-date with vaccinations from pulmonary perspective. 5. Up-to-date including Covid vaccine  6. Maintain an active lifestyle. 7. Patient's questions were answered to his satisfaction. 8. CPAP/BiPAP to be used for at least 4 hours every night. Use humidifier if needed. Weight loss was recommended and discussed. Recommended following good sleep hygiene instructions. Sleep hygiene instructions given to the patient. Explained importance of compliance with treatment of sleep apnea. Compliance data was reviewed and the patient is  compliant per insurance requirement. 9. We'll see the patient back in 12 months  10.  DME order for CPAP supplies

## 2021-08-18 NOTE — PROGRESS NOTES
Humberto Rincon  4/18/2019    Chief Complaint   Patient presents with    Follow-up    Obstructive sleep apnea syndrome    Humberto Rincon  presented for follow up for obstructive sleep apnea syndrome that is being successfully treated with CPAP. He using the CPAP regularly. He uses a machine 4-6 hours every night. He has not noted any problem with the use of the machine. He have had a new machine, which is which is very happy. No morning headaches. Nighttime sleep is refreshing. He doesn't feel sleepy, tired, fatigued during the daytime. No symptoms of cataplexy, sleep paralysis, or hallucination. Apparent leg movements. Sleep Medicine 4/18/2019 12/6/2018 10/9/2018   Sitting and reading 2 - 3   Watching TV 2 - 3   Sitting, inactive in a public place (e.g. a theatre or a meeting) 0 - 0   As a passenger in a car for an hour without a break 0 - 3   Lying down to rest in the afternoon when circumstances permit 3 - 3   Sitting and talking to someone 0 - 0   Sitting quietly after a lunch without alcohol 0 - 2   In a car, while stopped for a few minutes in traffic 0 - 0   Total score 7 - 14   Neck circumference - 17.5 -   Spiro Sleepiness Scale given to the patient the office revealed a score 07 and he can take like oh daytime sleepiness. At one time. Patient used to complain of insomnia that has resolved with the resolution of the sleep apnea. Very likely the insomnia symptoms are related to the presence of apnea are rather than primary insomnia. Patient is also known to have systemic hypertension that is under good control. No angina. No proximal nocturnal dyspnea. No myocardial infarction. No evidence of any heart failure. No chest pain.           Review of Systems -  General ROS: negative for - chills, fatigue, fever or weight loss  ENT ROS: negative for - headaches, oral lesions or sore throat  Cardiovascular ROS: no chest pain , orthopnea or pnd   Gastrointestinal ROS: no abdominal pain, Ray Grace is a 64 year old male presenting for Physical, Tinnitus    Medications verified, no changes.  Pharmacy verified     Denies known Latex allergy or symptoms of Latex sensitivity.    Social History     Tobacco Use   Smoking Status Never Smoker   Smokeless Tobacco Never Used       Health Maintenance Due   Topic Date Due   • DTaP/Tdap/Td Vaccine (1 - Tdap) Never done   • Shingles Vaccine (1 of 2) Never done       Patient is due for topics as listed above but is not proceeding with Immunization(s) Dtap/Tdap/Td and Shingles at this time. Patient will get done later.    Patient would like communication of their results via:        Cell Phone:   Telephone Information:   Mobile [phone]     Okay to leave a message containing results? Yes       change in bowel habits, or black or bloody stools  Skin - no rash   Neuro - no blurry vision , no loc . No focal weakness   msk - no jt tenderness or swelling    Vascular - no claudication , rest completed and negative   Lymphatic - complete and negative   Hematology - oncology - complete and negative   Allergy immunology - complete and negative    no burning or hematuria       LUNG CANCER SCREENING     1. CRITERIA MET    []     CT ORDERED  []      2. CRITERIA NOT MET   [x]      3. REFUSED                    []    Nonsmoker    REASON CRITERIA NOT MET     1. SMOKING LESS THAN 30 PY  []      2. AGE LESS THAN 55 or GREATER 77 YEARS  []      3. QUIT SMOKING 15 YEARS OR GREATER   []      4. RECENT CT WITH IN 11 MONTHS    []      5. LIFE EXPECTANCY < 5 YEARS   []      6.  SIGNS  AND SYMPTOMS OF LUNG CANCER   []           Immunization   Immunization History   Administered Date(s) Administered    Tdap (Boostrix, Adacel) 04/12/2010        Pneumococcal Vaccine     [] Up to date    [x] Indicated   [] Refused  [] Contraindicated       Influenza Vaccine   [] Up to date    [x] Indicated   [] Refused  [] Contraindicated       PAST MEDICAL HISTORY:         Diagnosis Date    Class 3 severe obesity due to excess calories with body mass index (BMI) greater than or equal to 70 in adult [facility])     Diverticulosis large intestine w/o perforation or abscess w/bleeding     Hypertension     Obesity     LEISA on CPAP     Osteoarthritis     Situational insomnia     Sleep apnea        Family History:       Problem Relation Age of Onset    Dementia Mother     Dementia Father     Kidney Disease Father     Cancer Brother     Brain Cancer Brother     Lung Cancer Brother     Heart Disease Brother        SURGICAL HISTORY:   Past Surgical History:   Procedure Laterality Date    [facility]    EYE SURGERY      as a child, eye extrusion    JOINT REPLACEMENT Right 2018    knee    KNEE ARTHROSCOPY Left 2010 bowel sounds active all four quadrants,     no masses, no organomegaly         2+ and symmetric all extremities            Cervical, supraclavicular not enlarged or matted or tender      CNII-XII intact, normal strength 5/5 . Sensation grossly normal  and reflexes normal 2+  throughout     Clubbing No  Lower ext edema No1+   [] , 2 +  [] , 3+   []  Upper ext edema No       Musculoskeletal - no joint swelling or tenderness or synovitis               /74   Pulse 59   Resp 14   Ht 5' 9\" (1.753 m)   Wt 227 lb (103 kg)   SpO2 96% Comment: room air at rest  BMI 33.52 kg/m²     CXR  No recent chest X      CT Scans  Recent CT    Echo  No recent echo        Assessment   Diagnosis Orders   1. LEISA on CPAP  DME Order for [facility] as OP   2. Essential hypertension     3. Class 2 severe obesity due to excess calories with serious comorbidity in adult, unspecified BMI ([facility] [address]         Plan:  Patient has obstructive sleep apnea syndrome that is responding well to the use of CPAP. Patient. He using the heated humidification along with the CPAP machine. He denies any complication. Later the use of CPAP. Given a prescription for a DME for supplies for the CPAP. His blood pressure is under good control. He was advised to continue the present antihypertensive regimen. Patient also advised to lose weight. Weight reduction will help the apnea and also the hypertension. Patient was counseled regarding need to take influenza vaccine and Pneumovax on a regular basis. He is not a candidate for lung cancer screening. Dictated by Dr. Jovan Cary M.D. dictation over thank you  Electronically signed by Deja Garcia MD on   4/18/19 at 12:06 PM  Please note that this chart was generated using voice recognition Dragon dictation software. Although every effort was made to ensure the accuracy of this automated transcription, some errors in transcription may have occurred.

## 2021-09-17 NOTE — PROGRESS NOTES
Telehealth visit via [employer] in epic    Subjective:      Patient ID: Eloina Sullivan is a 77 y.o. male. HPI  Patient is here for follow-up. Patient was last seen in the office in June or 2021 for LEISA on CPAP per me. Moved to [address] [place] [address]. Patient has a history of skin issues with irritation from his CPAP mask with headgear cutting into his ears and sores across the back of his head. He is still having difficulty with the mask sliding down. Feels like it is not staying in the appropriate place despite tightening his straps and replacing his headgear. Encourage patient to actually go to his [employer] company, pharmacy counter, and take his current mask and work with the technician at pharmacy counter to find a better fitting mask. Once he has received a new mask then we will have him follow-up with us 2 to 3 months later with a compliance report to look at his settings to make sure that he is getting adequate seal and treatment. Medications:   No pulmonary medications    PRIOR WORKUP:  PFT:  None on chart     CT Imaging:  None on chart     Sleep Study:  Sleep study 12/6/2018: Patient was treated with CPAP during the study. Patient was titrated onto CPAP at 13 cm H2O with no residual apneas. Lowest SPO2 was 90%.    Titration study 2/11/2011: During the night patient was treated with CPAP with respiratory events ultimately resolved at a pressure of 11 cm H2O. At this pressure there were 20 residual apneas and hypopneas with an AHI index of 4.4 events per hour sleep and a minimum SPO2 of 85%.     Sleep study 2/8/2011: Patient had a total of 46 apneas (44 obstructive, 0 central and 2 mixed) and 395 hypopneas. Apnea hypopnea index for the entire night was 66.3. AHI was 66.6 during NREM sleep compared to an AHI of 64.5 during REM sleep. Patient had a lowest SPO2 of 72%.     Sleep study 3/16/2004: Patient had a total of 107 respiratory events, 94 were partial, 13 complete.   Apnea hypopnea index was 14.9 events per hour. Lowest SPO2 saturation was 82.9%.     Laboratory Evaluation:       Immunizations:   Immunization History   Administered Date(s) Administered    Influenza Virus Vaccine 09/13/2019, 09/25/2020    Influenza, Quadv, IM, PF (6 mo and older Fluzone, Flulaval, Fluarix, and 3 yrs and older Afluria) 09/25/2020    Influenza, Kalpana Childes, Recombinant, IM PF (Flublok 18 yrs and older) 09/13/2019    Pneumococcal Conjugate 13-valent ([id number]) 09/25/2020    Tdap (Boostrix, Adacel) 02/01/2010, 04/12/2010    Zoster Recombinant (Shingrix) 12/08/2020        Sleep Medicine 9/29/2020 11/27/2019 4/18/2019 12/6/2018 10/9/2018   Sitting and reading 1 2 2 - 3   Watching TV 2 1 2 - 3   Sitting, inactive in a public place (e.g. a theatre or a meeting) 1 1 0 - 0   As a passenger in a car for an hour without a break 0 0 0 - 3   Lying down to rest in the afternoon when circumstances permit 1 3 3 - 3   Sitting and talking to someone 0 0 0 - 0   Sitting quietly after a lunch without alcohol 0 1 0 - 2   In a car, while stopped for a few minutes in traffic 0 0 0 - 0   Total score 5 8 7 - 14   Neck circumference - - - 17.5 -       There were no vitals taken for this visit.     Past Medical History:   Diagnosis Date    Class 3 severe obesity due to excess calories with body mass index (BMI) greater than or equal to 70 in adult [facility])     Diverticulosis large intestine w/o perforation or abscess w/bleeding     Hypertension     Obesity     LEISA on CPAP     Osteoarthritis     Situational insomnia     Sleep apnea      Past Surgical History:   Procedure Laterality Date    [facility]    EYE SURGERY      as a child, eye extrusion    JOINT REPLACEMENT Right 2018    knee    KNEE ARTHROSCOPY Left 2010     Family History   Problem Relation Age of Onset    Kidney Disease Mother         polycystic kidney    Breast Cancer Mother     Dementia Father     Cancer Brother     Brain Cancer Brother     Lung Cancer Brother     Heart Disease Brother        Social History     Socioeconomic History    Marital status:      Spouse name: Not on file    Number of children: 3    Years of education: Not on file    Highest education level: Not on file   Occupational History    Not on file   Tobacco Use    Smoking status: Never Smoker    Smokeless tobacco: Never Used   Vaping Use    Vaping Use: Never used   Substance and Sexual Activity    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    Drug use: No    Sexual activity: Yes     Partners: Female   Other Topics Concern    Not on file   Social History Narrative    Not on file     Social Determinants of Health     Financial Resource Strain: Low Risk     Difficulty of Paying Living Expenses: Not hard at all   Food Insecurity: No Food Insecurity    Worried About Running Out of Food in the Last Year: Never true    [address] in the Last Year: Never true   Transportation Needs: No Transportation Needs    Lack of Transportation (Medical): No    Lack of Transportation (Non-Medical): No   Physical Activity:     Days of Exercise per Week:     Minutes of Exercise per Session:    Stress:     Feeling of Stress :    Social Connections:     Frequency of Communication with Friends and Family:     Frequency of Social Gatherings with Friends and Family:     Attends Buddhist Services:     Active Member of Clubs or Organizations:     Attends Club or Organization Meetings:     Marital Status:    Intimate Partner Violence:     Fear of Current or Ex-Partner:     Emotionally Abused:     Physically Abused:     Sexually Abused:        Review of Systems   Constitutional: Negative. HENT:        Mask does not sit right on his face tends to slide down underneath his nose. He also has strep issues that are cutting into his ears in addition to sores on the back of his head from his mask straps. Eyes: Negative. Respiratory: Negative. Cardiovascular: Negative. Gastrointestinal: Negative. Endocrine: Negative. Genitourinary: Negative. Musculoskeletal: Negative. Skin: Negative. Allergic/Immunologic: Negative. Neurological: Negative. Hematological: Negative. Psychiatric/Behavioral: Negative. Objective:     Limited physical exam secondary to telehealth format. Exam completed as a visual exam.  Physical Exam  General appearance - alert, well appearing, and in no distress, oriented to person, place, and time and overweight  Mental status - alert, oriented to person, place, and time, normal mood, behavior, speech, dress, motor activity, and thought processes  Eyes - pupils equal and reactive, extraocular eye movements intact  Ears - not examined  Nose -not examined  Mouth -not examined  Neck -Short thick neck  Chest -not examined  Heart -not examined  Abdomen -not examined  Neuro-alert, oriented conversant.   Extremities -not examined  Skin -not examined    Wt Readings from Last 3 Encounters:   06/01/21 225 lb (102.1 kg)   05/11/21 221 lb 12.8 oz (100.6 kg)   12/18/20 224 lb 12.8 oz (102 kg)       Results for orders placed or performed during the hospital encounter of 12/31/20   TSH with Reflex   Result Value Ref Range    TSH 2.01 0.30 - 5.00 mIU/L   Lipid Panel   Result Value Ref Range    Cholesterol 178 <200 mg/dL    HDL 34 (L) >40 mg/dL    LDL Cholesterol 109 0 - 130 mg/dL    Chol/HDL Ratio 5.2 (H) <5    Triglycerides 173 (H) <150 mg/dL    VLDL NOT REPORTED (H) 1 - 30 mg/dL   Comprehensive Metabolic Panel   Result Value Ref Range    Glucose 110 (H) 70 - 99 mg/dL    BUN 12 8 - 23 mg/dL    CREATININE 0.93 0.70 - 1.20 mg/dL    Bun/Cre Ratio NOT REPORTED 9 - 20    Calcium 9.3 8.6 - 10.4 mg/dL    Sodium 145 (H) 135 - 144 mmol/L    Potassium 4.6 3.7 - 5.3 mmol/L    Chloride 106 98 - 107 mmol/L    CO2 28 20 - 31 mmol/L    Anion Gap 11 9 - 17 mmol/L    Alkaline Phosphatase 86 40 - 129 U/L    ALT 21 5 - 41 U/L    AST 18 <40 U/L    Total Bilirubin 0.56 0.3 - 1.2 mg/dL    Total Protein 6.8 6.4 - 8.3 g/dL    Albumin 4.1 3.5 - 5.2 g/dL    Albumin/Globulin Ratio 1.5 1.0 - 2.5    GFR Non-African American >60 >60 mL/min    GFR African American >60 >60 mL/min    GFR Comment          GFR Staging NOT REPORTED        No results found. Assessment:      1. LEISA on CPAP          Plan:      1. No pulmonary medications. 2. Educated and clarified the medication use. 3. Recommend flu vaccination in the fall annually. Due in fall  4. Patient is up-to-date with vaccinations from pulmonary perspective. Up-to-date including Covid  5. Maintain an active lifestyle. 6. Patient's questions were answered to his satisfaction. 7. No compliance data available for my review. Patient states that he does use his machine every single night. Having difficulty with mask fit. Encourage patient to take his current mask over to his [employer] company, pharmacy counter, and work with technician to find a better fitting mask that does not cause the irritation around his ears and the sores in the back of his head from the headgear.   8. We'll see the patient back in 4 months with compliance data        Electronically signed by REBECCA Varela CNP on 9/21/2021 at 9:20 AM

## 2021-09-21 ENCOUNTER — TELEMEDICINE (OUTPATIENT)
Dept: PULMONOLOGY | Age: 66
End: 2021-09-21
Payer: MEDICARE

## 2021-09-21 DIAGNOSIS — Z99.89 OSA ON CPAP: Primary | ICD-10-CM

## 2021-09-21 DIAGNOSIS — G47.33 OSA ON CPAP: Primary | ICD-10-CM

## 2021-09-21 PROCEDURE — 3017F COLORECTAL CA SCREEN DOC REV: CPT | Performed by: NURSE PRACTITIONER

## 2021-09-21 PROCEDURE — 4040F PNEUMOC VAC/ADMIN/RCVD: CPT | Performed by: NURSE PRACTITIONER

## 2021-09-21 PROCEDURE — G8427 DOCREV CUR MEDS BY ELIG CLIN: HCPCS | Performed by: NURSE PRACTITIONER

## 2021-09-21 PROCEDURE — 99213 OFFICE O/P EST LOW 20 MIN: CPT | Performed by: NURSE PRACTITIONER

## 2021-09-21 PROCEDURE — 1123F ACP DISCUSS/DSCN MKR DOCD: CPT | Performed by: NURSE PRACTITIONER

## 2021-09-21 ASSESSMENT — ENCOUNTER SYMPTOMS
EYES NEGATIVE: 1
RESPIRATORY NEGATIVE: 1
ALLERGIC/IMMUNOLOGIC NEGATIVE: 1
GASTROINTESTINAL NEGATIVE: 1

## 2021-09-21 NOTE — PATIENT INSTRUCTIONS
Per Lorenzo Mcclure: Patient is going to pharmacy counter today to see about getting a new mask and will call us for an appointment 2-3 months after receiving new mask. Elena Sanders

## 2021-10-13 ENCOUNTER — TELEPHONE (OUTPATIENT)
Dept: FAMILY MEDICINE CLINIC | Age: 66
End: 2021-10-13

## 2021-10-13 NOTE — TELEPHONE ENCOUNTER
Tj Rincon was contacted as a part of [employer]. A voicemail message was left reminding the patient to schedule an AWV with their PCP.

## 2021-11-05 ENCOUNTER — TELEPHONE (OUTPATIENT)
Dept: FAMILY MEDICINE CLINIC | Age: 66
End: 2021-11-05

## 2021-11-05 NOTE — TELEPHONE ENCOUNTER
Stuffy/runny nose  Fever  Coughing  Unable to smell   X 3 days     Used [facility] Plus day and night time meds.      Pharmacy    [facility] [address] [facility], [address] [place] [address] [address] [facility]

## 2021-11-08 ENCOUNTER — TELEMEDICINE (OUTPATIENT)
Dept: FAMILY MEDICINE CLINIC | Age: 66
End: 2021-11-08
Payer: MEDICARE

## 2021-11-08 ENCOUNTER — TELEPHONE (OUTPATIENT)
Dept: FAMILY MEDICINE CLINIC | Age: 66
End: 2021-11-08

## 2021-11-08 DIAGNOSIS — U07.1 COVID-19: Primary | ICD-10-CM

## 2021-11-08 PROCEDURE — 3288F FALL RISK ASSESSMENT DOCD: CPT | Performed by: FAMILY MEDICINE

## 2021-11-08 PROCEDURE — 4040F PNEUMOC VAC/ADMIN/RCVD: CPT | Performed by: FAMILY MEDICINE

## 2021-11-08 PROCEDURE — 99213 OFFICE O/P EST LOW 20 MIN: CPT | Performed by: FAMILY MEDICINE

## 2021-11-08 PROCEDURE — 1123F ACP DISCUSS/DSCN MKR DOCD: CPT | Performed by: FAMILY MEDICINE

## 2021-11-08 PROCEDURE — 3017F COLORECTAL CA SCREEN DOC REV: CPT | Performed by: FAMILY MEDICINE

## 2021-11-08 PROCEDURE — G8427 DOCREV CUR MEDS BY ELIG CLIN: HCPCS | Performed by: FAMILY MEDICINE

## 2021-11-08 ASSESSMENT — ENCOUNTER SYMPTOMS
RHINORRHEA: 1
COUGH: 1

## 2021-11-08 NOTE — PROGRESS NOTES
[facility]  [facility]  [address]  [facility] [address]  Dept: [phone]    2021    TELEHEALTH EVALUATION -- Audio/Visual (During GNPHR-72 public health emergency)  Fish Rincon (:  1955) has requested an audio/video evaluation for the following concern(s):    HPI:  Video visit to discuss possible covid sx. Loss of smell for 5 days, headache, congestion, cough, fever. Did a rapid test today that was positive from [employer]. Wants to get monoclonal antibody infusion if possible. Was exposed to his sister-in-law who is now hospitalized with covid. Has been vaccinated with Claudeen Fermo with second injection in April. Wife has sx also, vaccinated with Jessica Canal. There were no vitals filed for this visit. REVIEW OF SYSTEMS:   Review of Systems   Constitutional: Positive for fatigue. HENT: Positive for congestion and rhinorrhea. Loss of smell   Respiratory: Positive for cough.         PAST MEDICAL HISTORY:    Past Medical History:   Diagnosis Date    Class 3 severe obesity due to excess calories with body mass index (BMI) greater than or equal to 70 in adult [facility])     Diverticulosis large intestine w/o perforation or abscess w/bleeding     Hypertension     Obesity     LEISA on CPAP     Osteoarthritis     Situational insomnia     Sleep apnea        FAMILY HISTORY:    Family History   Problem Relation Age of Onset    Kidney Disease Mother         polycystic kidney    Breast Cancer Mother     Dementia Father     Cancer Brother     Brain Cancer Brother     Lung Cancer Brother     Heart Disease Brother        SOCIAL HISTORY:    Social History     Socioeconomic History    Marital status:      Spouse name: Not on file    Number of children: 3    Years of education: Not on file    Highest education level: Not on file   Occupational History    Not on file   Tobacco Use    Smoking status: Never Smoker    Smokeless tobacco: Never Used   Vaping Use    Vaping Use: Never used   Substance and Sexual Activity    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    Drug use: No    Sexual activity: Yes     Partners: Female   Other Topics Concern    Not on file   Social History Narrative    Not on file     Social Determinants of Health     Financial Resource Strain: Low Risk     Difficulty of Paying Living Expenses: Not hard at all   Food Insecurity: No Food Insecurity    Worried About Running Out of Food in the Last Year: Never true    Katelynn of Food in the Last Year: Never true   Transportation Needs: No Transportation Needs    Lack of Transportation (Medical): No    Lack of Transportation (Non-Medical):  No   Physical Activity:     Days of Exercise per Week: Not on file    Minutes of Exercise per Session: Not on file   Stress:     Feeling of Stress : Not on file   Social Connections:     Frequency of Communication with Friends and Family: Not on file    Frequency of Social Gatherings with Friends and Family: Not on file    Attends Adventism Services: Not on file    Active Member of Clubs or Organizations: Not on file    Attends Club or Organization Meetings: Not on file    Marital Status: Not on file   Intimate Partner Violence:     Fear of Current or Ex-Partner: Not on file    Emotionally Abused: Not on file    Physically Abused: Not on file    Sexually Abused: Not on file   Housing Stability:     Unable to Pay for Housing in the Last Year: Not on file    Number of [place] in the Last Year: Not on file    Unstable Housing in the Last Year: Not on file       SURGICAL HISTORY:    Past Surgical History:   Procedure Laterality Date    [facility]    EYE SURGERY      as a child, eye extrusion    JOINT REPLACEMENT Right 2018    knee    KNEE ARTHROSCOPY Left 2010       CURRENT MEDICATIONS:    Current Outpatient Medications   Medication Sig Dispense Refill    vitamin B-12 (CYANOCOBALAMIN) 1000 MCG tablet Take 2,500 mcg by mouth daily      folic acid (FOLVITE) 151 MCG tablet Take by mouth daily      vitamin B-6 (PYRIDOXINE) 50 MG tablet Take 50 mg by mouth daily      vitamin E 400 UNIT capsule Take 400 Units by mouth daily      Coenzyme Q10 (CO Q 10) 10 MG CAPS Take 300 mg by mouth      amLODIPine (NORVASC) 5 MG tablet Take 1 tablet by mouth daily 90 tablet 3    lisinopril-hydroCHLOROthiazide (PRINZIDE;ZESTORETIC) 20-12.5 MG per tablet TAKE ONE TABLET BY MOUTH DAILY 90 tablet 4     No current facility-administered medications for this visit. ALLERGIES:   No Known Allergies    PHYSICAL EXAM:   Physical Exam  Vitals reviewed. Constitutional:       Appearance: Normal appearance. HENT:      Head: Normocephalic. Nose: Congestion present. Eyes:      Extraocular Movements: Extraocular movements intact. Conjunctiva/sclera: Conjunctivae normal.   Pulmonary:      Effort: Pulmonary effort is normal.      Comments: Occasional loose cough  Musculoskeletal:      Cervical back: Normal range of motion. Neurological:      General: No focal deficit present. Mental Status: He is alert and oriented to person, place, and time. Mental status is at baseline. Psychiatric:         Mood and Affect: Mood normal.         Behavior: Behavior normal.         Thought Content: Thought content normal.         Judgment: Judgment normal.          ASSESSMENT/PLAN:  1. COVID-19  Pt tested positive today for Covid. Symptoms started 5 to 6 days ago. Will contact pharmacy in the a.m. to get him set up for monoclonal antibodies. He will also call with his wife's test results to see if she is a candidate. Discussed that he should also get his booster vaccine when feeling better. Return if symptoms worsen or fail to improve. Zhane Stephens is a 77 y.o. male being evaluated by a Virtual Visit (video visit) encounter to address concerns as mentioned above.   A caregiver was present when appropriate. Due to this being a TeleHealth encounter ([facility]), evaluation of the following organ systems was limited: Vitals/Constitutional/EENT/Resp/CV/GI//MS/Neuro/Skin/Heme-Lymph-Imm. Pursuant to the emergency declaration under the [address] [place] [address], [address] [place] [address] and the [employer] Act, this Virtual Visit was conducted with patient's (and/or legal guardian's) consent, to reduce the patient's risk of exposure to COVID-19 and provide necessary medical care. The patient (and/or legal guardian) has also been advised to contact this office for worsening conditions or problems, and seek emergency medical treatment and/or call 911 if deemed necessary. Services were provided through a video synchronous discussion virtually to substitute for in-person clinic visit. Patient and provider were located at their individual homes. --Giselle Cadena MD on 11/8/2021 at 4:07 PM    An electronic signature was used to authenticate this note.

## 2021-11-08 NOTE — TELEPHONE ENCOUNTER
Pt's covid test pending:   Would like the following:    monoclonal antibodies    [facility]

## 2021-11-08 NOTE — TELEPHONE ENCOUNTER
He has to have a positive test before they will do it. Tell him to call back when he has results. Where was he tested? They will also accept the home rapid test if it is positive.

## 2021-11-09 ENCOUNTER — TELEPHONE (OUTPATIENT)
Dept: FAMILY MEDICINE CLINIC | Age: 66
End: 2021-11-09

## 2021-11-09 DIAGNOSIS — U07.1 COVID: ICD-10-CM

## 2021-11-09 DIAGNOSIS — U07.1 COVID-19: Primary | ICD-10-CM

## 2021-11-09 NOTE — TELEPHONE ENCOUNTER
Office spoke to Henry Scanlon ([phone]) the Pharmacist at [facility] @ 9:51 am. Patient information was given and phone number. Henry Scanlon will call the patient and schedule the appointment. (Monoclonal Antibodies)  Patient notified of coarse of action and is in agreement to this.      1 week symptoms, tested positive on 11/08/2021 for COVID

## 2021-11-10 ENCOUNTER — TELEPHONE (OUTPATIENT)
Dept: FAMILY MEDICINE CLINIC | Age: 66
End: 2021-11-10

## 2021-11-10 ENCOUNTER — HOSPITAL ENCOUNTER (OUTPATIENT)
Dept: INFUSION THERAPY | Age: 66
Setting detail: INFUSION SERIES
Discharge: HOME | End: 2021-11-10
Attending: INTERNAL MEDICINE
Payer: MEDICARE

## 2021-11-10 ENCOUNTER — HOSPITAL ENCOUNTER (OUTPATIENT)
Age: 66
Discharge: HOME | End: 2021-11-12
Payer: MEDICARE

## 2021-11-10 ENCOUNTER — HOSPITAL ENCOUNTER (OUTPATIENT)
Dept: GENERAL RADIOLOGY | Age: 66
Discharge: HOME | End: 2021-11-12
Payer: MEDICARE

## 2021-11-10 VITALS
SYSTOLIC BLOOD PRESSURE: 121 MMHG | HEART RATE: 65 BPM | DIASTOLIC BLOOD PRESSURE: 71 MMHG | TEMPERATURE: 98.2 F | RESPIRATION RATE: 16 BRPM | OXYGEN SATURATION: 95 %

## 2021-11-10 DIAGNOSIS — U07.1 COVID: ICD-10-CM

## 2021-11-10 DIAGNOSIS — U07.1 COVID-19: Primary | ICD-10-CM

## 2021-11-10 DIAGNOSIS — U07.1 COVID-19: ICD-10-CM

## 2021-11-10 PROCEDURE — 71046 X-RAY EXAM CHEST 2 VIEWS: CPT

## 2021-11-10 PROCEDURE — M0243 CASIRIVI AND IMDEVI INFUSION: HCPCS

## 2021-11-10 PROCEDURE — 2580000003 HC RX 258: Performed by: FAMILY MEDICINE

## 2021-11-10 PROCEDURE — 6360000002 HC RX W HCPCS: Performed by: FAMILY MEDICINE

## 2021-11-10 PROCEDURE — 96365 THER/PROPH/DIAG IV INF INIT: CPT

## 2021-11-10 RX ADMIN — CASIRIVIMAB AND IMDEVIMAB: 600; 600 INJECTION, SOLUTION, CONCENTRATE INTRAVENOUS at 09:02

## 2021-11-11 NOTE — FLOWSHEET NOTE
11/11/21 1230 Antibody infusion discharge follow up: Pt reports he feels like he has more energy today but still feels like he has a head cold.

## 2021-12-03 ENCOUNTER — TELEPHONE (OUTPATIENT)
Dept: FAMILY MEDICINE CLINIC | Age: 66
End: 2021-12-03

## 2021-12-03 RX ORDER — FLUTICASONE PROPIONATE 50 MCG
2 SPRAY, SUSPENSION (ML) NASAL DAILY
Qty: 16 G | Refills: 3 | Status: SHIPPED | OUTPATIENT
Start: 2021-12-03

## 2021-12-22 NOTE — PROGRESS NOTES
During the night patient was treated with CPAP with respiratory events ultimately resolved at a pressure of 11 cm H2O.  At this pressure there were 20 residual apneas and hypopneas with an AHI index of 4.4 events per hour sleep and a minimum SPO2 of 85%.     Sleep study 2/8/2011: Patient had a total of 46 apneas (44 obstructive, 0 central and 2 mixed) and 395 hypopneas.  Apnea hypopnea index for the entire night was 66.3.  AHI was 66.6 during NREM sleep compared to an AHI of 64.5 during REM sleep.  Patient had a lowest SPO2 of 72%.     Sleep study 3/16/2004: Patient had a total of 107 respiratory events, 94 were partial, 13 complete.  Apnea hypopnea index was 14.9 events per hour.  Lowest SPO2 saturation was 82.9%.       Immunizations:   Immunization History   Administered Date(s) Administered    Influenza Virus Vaccine 09/13/2019, 09/25/2020    Influenza, Quadv, IM, PF (6 mo and older Fluzone, Flulaval, Fluarix, and 3 yrs and older Afluria) 09/25/2020    Influenza, Delfin Batty, Recombinant, IM PF (Flublok 18 yrs and older) 09/13/2019    Pneumococcal Conjugate 13-valent ([id number]) 09/25/2020    Tdap (Boostrix, Adacel) 02/01/2010, 04/12/2010    Zoster Recombinant (Shingrix) 12/08/2020        Sleep Medicine 9/29/2020 11/27/2019 4/18/2019 12/6/2018 10/9/2018   Sitting and reading 1 2 2 - 3   Watching TV 2 1 2 - 3   Sitting, inactive in a public place (e.g. a theatre or a meeting) 1 1 0 - 0   As a passenger in a car for an hour without a break 0 0 0 - 3   Lying down to rest in the afternoon when circumstances permit 1 3 3 - 3   Sitting and talking to someone 0 0 0 - 0   Sitting quietly after a lunch without alcohol 0 1 0 - 2   In a car, while stopped for a few minutes in traffic 0 0 0 - 0   Total score 5 8 7 - 14   Neck circumference - - - 17.5 -       /73 (Site: Left Upper Arm, Position: Sitting, Cuff Size: Large Adult)   Pulse 78   Temp 97.6 °F (36.4 °C) (Temporal)   Resp 18   Ht 5' 9\" (1.753 m)   Wt 225 lb (102.1 kg)   SpO2 97% Comment: rm air at rest  BMI 33.23 kg/m²     Past Medical History:   Diagnosis Date    Class 3 severe obesity due to excess calories with body mass index (BMI) greater than or equal to 70 in adult ([facility] [address]     COVID 11/9/2021    Diverticulosis large intestine w/o perforation or abscess w/bleeding     Hypertension     Obesity     LEISA on CPAP     Osteoarthritis     Situational insomnia     Sleep apnea      Past Surgical History:   Procedure Laterality Date    [facility]    EYE SURGERY      as a child, eye extrusion    JOINT REPLACEMENT Right 2018    knee    KNEE ARTHROSCOPY Left 2010     Family History   Problem Relation Age of Onset    Kidney Disease Mother         polycystic kidney    Breast Cancer Mother     Dementia Father     Cancer Brother     Brain Cancer Brother     Lung Cancer Brother     Heart Disease Brother        Social History     Socioeconomic History    Marital status:      Spouse name: Not on file    Number of children: 3    Years of education: Not on file    Highest education level: Not on file   Occupational History    Not on file   Tobacco Use    Smoking status: Never Smoker    Smokeless tobacco: Never Used   Vaping Use    Vaping Use: Never used   Substance and Sexual Activity    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    Drug use: No    Sexual activity: Yes     Partners: Female   Other Topics Concern    Not on file   Social History Narrative    Not on file     Social Determinants of Health     Financial Resource Strain:     Difficulty of Paying Living Expenses: Not on file   Food Insecurity:     Worried About Running Out of Food in the Last Year: Not on file    Katelynn of Food in the Last Year: Not on file   Transportation Needs:     Lack of Transportation (Medical): Not on file    Lack of Transportation (Non-Medical):  Not on file   Physical Activity:     Days of Exercise per Week: Not on file    Minutes of Exercise per Session: Not on file   Stress:     Feeling of Stress : Not on file   Social Connections:     Frequency of Communication with Friends and Family: Not on file    Frequency of Social Gatherings with Friends and Family: Not on file    Attends Orthodox Services: Not on file    Active Member of Clubs or Organizations: Not on file    Attends Club or Organization Meetings: Not on file    Marital Status: Not on file   Intimate Partner Violence:     Fear of Current or Ex-Partner: Not on file    Emotionally Abused: Not on file    Physically Abused: Not on file    Sexually Abused: Not on file   Housing Stability:     Unable to Pay for Housing in the Last Year: Not on file    Number of [place] in the Last Year: Not on file    Unstable Housing in the Last Year: Not on file       Review of Systems   Constitutional: Negative. HENT: Negative. Eyes: Negative. Respiratory: Negative. Cardiovascular: Negative. Gastrointestinal: Negative. Endocrine: Negative. Genitourinary: Negative. Musculoskeletal: Negative. Skin: Negative. Allergic/Immunologic: Negative. Neurological: Negative. Hematological: Negative. Psychiatric/Behavioral: Negative.         Objective:       Physical Exam  General appearance - alert, well appearing, and in no distress, oriented to person, place, and time and overweight  Mental status - alert, oriented to person, place, and time, normal mood, behavior, speech, dress, motor activity, and thought processes  Eyes - pupils equal and reactive, extraocular eye movements intact  Ears - not examined  Nose - normal and patent, no erythema, discharge or polyps  Mouth - mucous membranes moist, pharynx normal without lesions  Neck - supple, no significant adenopathy  Chest - clear to auscultation, no wheezes, rales or rhonchi, symmetric air entry  Heart -normal rate, regular rhythm, normal S1, S2, no murmurs, rubs, clicks or gallops  Abdomen - soft, nontender, nondistended, no masses or organomegaly  Neuro- alert, oriented, normal speech, no focal findings or movement disorder noted}  Extremities - peripheral pulses normal, no pedal edema, no clubbing or cyanosis  Skin - normal coloration and turgor, no rashes, no suspicious skin lesions noted     Wt Readings from Last 3 Encounters:   01/11/22 225 lb (102.1 kg)   06/01/21 225 lb (102.1 kg)   05/11/21 221 lb 12.8 oz (100.6 kg)       Results for orders placed or performed during the hospital encounter of 12/31/20   TSH with Reflex   Result Value Ref Range    TSH 2.01 0.30 - 5.00 mIU/L   Lipid Panel   Result Value Ref Range    Cholesterol 178 <200 mg/dL    HDL 34 (L) >40 mg/dL    LDL Cholesterol 109 0 - 130 mg/dL    Chol/HDL Ratio 5.2 (H) <5    Triglycerides 173 (H) <150 mg/dL    VLDL NOT REPORTED (H) 1 - 30 mg/dL   Comprehensive Metabolic Panel   Result Value Ref Range    Glucose 110 (H) 70 - 99 mg/dL    BUN 12 8 - 23 mg/dL    CREATININE 0.93 0.70 - 1.20 mg/dL    Bun/Cre Ratio NOT REPORTED 9 - 20    Calcium 9.3 8.6 - 10.4 mg/dL    Sodium 145 (H) 135 - 144 mmol/L    Potassium 4.6 3.7 - 5.3 mmol/L    Chloride 106 98 - 107 mmol/L    CO2 28 20 - 31 mmol/L    Anion Gap 11 9 - 17 mmol/L    Alkaline Phosphatase 86 40 - 129 U/L    ALT 21 5 - 41 U/L    AST 18 <40 U/L    Total Bilirubin 0.56 0.3 - 1.2 mg/dL    Total Protein 6.8 6.4 - 8.3 g/dL    Albumin 4.1 3.5 - 5.2 g/dL    Albumin/Globulin Ratio 1.5 1.0 - 2.5    GFR Non-African American >60 >60 mL/min    GFR African American >60 >60 mL/min    GFR Comment          GFR Staging NOT REPORTED        No results found. Assessment:      1. LEISA on CPAP    2. BMI 33.0-33.9,adult    3. Class 2 severe obesity due to excess calories with serious comorbidity in adult, unspecified BMI ([facility] [address]          Plan:      1. Medications reviewed. 2. Educated and clarified the medication use. 3. Recommend flu vaccination in the fall annually. Up to date  4.  Patient is up-to-date with pneumococcal vaccinations from pulmonary perspective. 5. Patient has received Covid vaccination. Recommended booster when eligible. Discussed strategies to protect against Covid 19.   6. Maintain an active lifestyle. 7. Patient's questions were answered to his satisfaction. 8. CPAP/BiPAP to be used for at least 4 hours every night. Use humidifier if needed. Weight loss was recommended and discussed. Recommended following good sleep hygiene instructions. Sleep hygiene instructions given to the patient. Explained importance of compliance with treatment of sleep apnea. Compliance data was reviewed and the patient is  compliant per insurance requirement. 9. Patient reporting vivid dreams, discussed adding trazodone however patient would like to hold off on additional medications at this time.   10. We'll see the patient back in 6  months          Electronically signed by REBECCA Mera CNP on 1/11/2022 at 11:46 AM

## 2022-01-11 ENCOUNTER — OFFICE VISIT (OUTPATIENT)
Dept: PULMONOLOGY | Age: 67
End: 2022-01-11
Payer: MEDICARE

## 2022-01-11 VITALS
TEMPERATURE: 97.6 F | SYSTOLIC BLOOD PRESSURE: 114 MMHG | OXYGEN SATURATION: 97 % | HEART RATE: 78 BPM | WEIGHT: 225 LBS | BODY MASS INDEX: 33.33 KG/M2 | DIASTOLIC BLOOD PRESSURE: 73 MMHG | HEIGHT: 69 IN | RESPIRATION RATE: 18 BRPM

## 2022-01-11 DIAGNOSIS — G47.33 OSA ON CPAP: Primary | ICD-10-CM

## 2022-01-11 DIAGNOSIS — E66.01 CLASS 2 SEVERE OBESITY DUE TO EXCESS CALORIES WITH SERIOUS COMORBIDITY IN ADULT, UNSPECIFIED BMI (HCC): ICD-10-CM

## 2022-01-11 DIAGNOSIS — Z99.89 OSA ON CPAP: Primary | ICD-10-CM

## 2022-01-11 PROCEDURE — 99213 OFFICE O/P EST LOW 20 MIN: CPT | Performed by: NURSE PRACTITIONER

## 2022-01-11 PROCEDURE — 4040F PNEUMOC VAC/ADMIN/RCVD: CPT | Performed by: NURSE PRACTITIONER

## 2022-01-11 PROCEDURE — 1036F TOBACCO NON-USER: CPT | Performed by: NURSE PRACTITIONER

## 2022-01-11 PROCEDURE — 3017F COLORECTAL CA SCREEN DOC REV: CPT | Performed by: NURSE PRACTITIONER

## 2022-01-11 PROCEDURE — G8427 DOCREV CUR MEDS BY ELIG CLIN: HCPCS | Performed by: NURSE PRACTITIONER

## 2022-01-11 PROCEDURE — 1123F ACP DISCUSS/DSCN MKR DOCD: CPT | Performed by: NURSE PRACTITIONER

## 2022-01-11 PROCEDURE — G8484 FLU IMMUNIZE NO ADMIN: HCPCS | Performed by: NURSE PRACTITIONER

## 2022-01-11 PROCEDURE — G8417 CALC BMI ABV UP PARAM F/U: HCPCS | Performed by: NURSE PRACTITIONER

## 2022-01-11 ASSESSMENT — ENCOUNTER SYMPTOMS
RESPIRATORY NEGATIVE: 1
ALLERGIC/IMMUNOLOGIC NEGATIVE: 1
GASTROINTESTINAL NEGATIVE: 1
EYES NEGATIVE: 1

## 2022-01-12 ENCOUNTER — TELEPHONE (OUTPATIENT)
Dept: FAMILY MEDICINE CLINIC | Age: 67
End: 2022-01-12

## 2022-01-12 NOTE — TELEPHONE ENCOUNTER
Patient calling because he states he went to see his pulmonologist and they offered to prescribe him trazodone. Patient wanted to ask physician advice on medication on whether the medication would interfere with his other medication. Patient states if physician agrees the medication would help him, what dosage would be appropriate for him. Patient wants to know does he need  A appointment to discuss this with physician.  Please advise

## 2022-01-13 NOTE — TELEPHONE ENCOUNTER
It would be okay with his other medications. Is it being prescribed for sleep?   The usual dose would be 50 to 100 mg at night

## 2022-02-24 ENCOUNTER — TELEPHONE (OUTPATIENT)
Dept: FAMILY MEDICINE CLINIC | Age: 67
End: 2022-02-24

## 2022-04-04 ENCOUNTER — OFFICE VISIT (OUTPATIENT)
Dept: FAMILY MEDICINE CLINIC | Age: 67
End: 2022-04-04
Payer: MEDICARE

## 2022-04-04 VITALS
DIASTOLIC BLOOD PRESSURE: 74 MMHG | BODY MASS INDEX: 33.74 KG/M2 | WEIGHT: 227.8 LBS | HEIGHT: 69 IN | OXYGEN SATURATION: 98 % | SYSTOLIC BLOOD PRESSURE: 116 MMHG | TEMPERATURE: 97.4 F | HEART RATE: 69 BPM | RESPIRATION RATE: 16 BRPM

## 2022-04-04 DIAGNOSIS — R73.03 PRE-DIABETES: ICD-10-CM

## 2022-04-04 DIAGNOSIS — E78.6 LOW HDL (UNDER 40): ICD-10-CM

## 2022-04-04 DIAGNOSIS — Z12.5 PROSTATE CANCER SCREENING: ICD-10-CM

## 2022-04-04 DIAGNOSIS — R53.83 OTHER FATIGUE: ICD-10-CM

## 2022-04-04 DIAGNOSIS — I10 ESSENTIAL HYPERTENSION: ICD-10-CM

## 2022-04-04 DIAGNOSIS — Z00.00 INITIAL MEDICARE ANNUAL WELLNESS VISIT: Primary | ICD-10-CM

## 2022-04-04 PROBLEM — M17.12 LEFT KNEE DJD: Status: ACTIVE | Noted: 2020-08-14

## 2022-04-04 PROBLEM — M19.90 OSTEOARTHRITIS: Status: ACTIVE | Noted: 2022-04-04

## 2022-04-04 PROBLEM — Z96.652 S/P TKR (TOTAL KNEE REPLACEMENT) USING CEMENT, LEFT: Status: ACTIVE | Noted: 2021-10-06

## 2022-04-04 LAB — HBA1C MFR BLD: 5.5 %

## 2022-04-04 PROCEDURE — 1123F ACP DISCUSS/DSCN MKR DOCD: CPT | Performed by: NURSE PRACTITIONER

## 2022-04-04 PROCEDURE — 4040F PNEUMOC VAC/ADMIN/RCVD: CPT | Performed by: NURSE PRACTITIONER

## 2022-04-04 PROCEDURE — 83036 HEMOGLOBIN GLYCOSYLATED A1C: CPT | Performed by: NURSE PRACTITIONER

## 2022-04-04 PROCEDURE — 3017F COLORECTAL CA SCREEN DOC REV: CPT | Performed by: NURSE PRACTITIONER

## 2022-04-04 PROCEDURE — G0438 PPPS, INITIAL VISIT: HCPCS | Performed by: NURSE PRACTITIONER

## 2022-04-04 SDOH — ECONOMIC STABILITY: FOOD INSECURITY: WITHIN THE PAST 12 MONTHS, YOU WORRIED THAT YOUR FOOD WOULD RUN OUT BEFORE YOU GOT MONEY TO BUY MORE.: NEVER TRUE

## 2022-04-04 SDOH — ECONOMIC STABILITY: FOOD INSECURITY: WITHIN THE PAST 12 MONTHS, THE FOOD YOU BOUGHT JUST DIDN'T LAST AND YOU DIDN'T HAVE MONEY TO GET MORE.: NEVER TRUE

## 2022-04-04 ASSESSMENT — PATIENT HEALTH QUESTIONNAIRE - PHQ9
5. POOR APPETITE OR OVEREATING: 1
9. THOUGHTS THAT YOU WOULD BE BETTER OFF DEAD, OR OF HURTING YOURSELF: 0
7. TROUBLE CONCENTRATING ON THINGS, SUCH AS READING THE NEWSPAPER OR WATCHING TELEVISION: 1
SUM OF ALL RESPONSES TO PHQ QUESTIONS 1-9: 0
6. FEELING BAD ABOUT YOURSELF - OR THAT YOU ARE A FAILURE OR HAVE LET YOURSELF OR YOUR FAMILY DOWN: 0
SUM OF ALL RESPONSES TO PHQ QUESTIONS 1-9: 0
SUM OF ALL RESPONSES TO PHQ QUESTIONS 1-9: 0
SUM OF ALL RESPONSES TO PHQ9 QUESTIONS 1 & 2: 0
10. IF YOU CHECKED OFF ANY PROBLEMS, HOW DIFFICULT HAVE THESE PROBLEMS MADE IT FOR YOU TO DO YOUR WORK, TAKE CARE OF THINGS AT HOME, OR GET ALONG WITH OTHER PEOPLE: 0
8. MOVING OR SPEAKING SO SLOWLY THAT OTHER PEOPLE COULD HAVE NOTICED. OR THE OPPOSITE, BEING SO FIGETY OR RESTLESS THAT YOU HAVE BEEN MOVING AROUND A LOT MORE THAN USUAL: 0
SUM OF ALL RESPONSES TO PHQ QUESTIONS 1-9: 6
3. TROUBLE FALLING OR STAYING ASLEEP: 3
SUM OF ALL RESPONSES TO PHQ QUESTIONS 1-9: 6
SUM OF ALL RESPONSES TO PHQ QUESTIONS 1-9: 6
4. FEELING TIRED OR HAVING LITTLE ENERGY: 1
SUM OF ALL RESPONSES TO PHQ QUESTIONS 1-9: 6
1. LITTLE INTEREST OR PLEASURE IN DOING THINGS: 0
2. FEELING DOWN, DEPRESSED OR HOPELESS: 0
SUM OF ALL RESPONSES TO PHQ QUESTIONS 1-9: 0

## 2022-04-04 ASSESSMENT — SOCIAL DETERMINANTS OF HEALTH (SDOH): HOW HARD IS IT FOR YOU TO PAY FOR THE VERY BASICS LIKE FOOD, HOUSING, MEDICAL CARE, AND HEATING?: NOT HARD AT ALL

## 2022-04-04 ASSESSMENT — LIFESTYLE VARIABLES: HOW OFTEN DO YOU HAVE A DRINK CONTAINING ALCOHOL: NEVER

## 2022-04-04 NOTE — PROGRESS NOTES
Medicare Annual Wellness Visit    Case Rincon is here for [facility] AWV    Patient here for Medicare annual wellness. Last appointment with PCP Dr. Jose M Donaldson was November 2021 for a video visit for COVID-19 symptoms. Prior to that he has been seen for physical in May 2021. History of hypertension and diabetes. Last labs were completed in December 2020. Hypertensioncurrently taking lisinoprilhydrochlorothiazide 20-12.5 mg and amlodipine 5 mg daily. Denies chest pain, dizziness, shortness of breath and leg swelling. Requesting to d/c amlodipine or reduce lisinopril. He notes the amlodipine was started by vascular due to his BLE numbness. Prediabeteslast A1c October 2020 was 5.7. Diet controlled. Has intermittent bilateral foot numbness. Has seen Dr. America Thompson and had numbness    Notable SOB recently with [place] fishing- he's not sure if it's due to inactivity this winter. Depressive symptoms- he is noting a short temper intermittently. He's frustrated with his wife's need for care and lack of motivation to do things for herself that she can. Noting fatigue- feels like no matter how much sleep he gets he always tired. He has been retired. He is feeling exhausted, but feels it's related to his lifestyle. Lab Results   Component Value Date    LABA1C 5.5 04/04/2022    LABA1C 5.7 10/19/2020    LABA1C 6.0 01/03/2020     Lab Results   Component Value Date     07/20/2019     History of chronic back pain, shoulder pain and bilateral knee replacements in the past.  Taking Aleve for osteoarthritis pain    History of obstructive sleep apneawearing mask nightly. History of 4 mm polyp in the transverse colon that was removed in October 2019. Mild diverticulosis noted in the sigmoid colon with no evidence of bleeding.       Assessment & Plan   Initial Medicare annual wellness visit  Pre-diabetes  Assessment & Plan:   Well-controlled, continue current treatment plan     Results for orders placed or performed in visit on 04/04/22   POCT glycosylated hemoglobin (Hb A1C)   Result Value Ref Range    Hemoglobin A1C 5.5 %       Orders:  -     POCT glycosylated hemoglobin (Hb A1C)  Low HDL (under 40)  Assessment & Plan:   Asymptomatic, continue current treatment plan  Orders:  -     Lipid Panel; Future  Essential hypertension  Assessment & Plan:   Well-controlled, changes made today: Per patient's request we will discontinue amlodipine and reevaluate in May with plans to decrease lisinopril further if blood pressure remains within normal limits. Orders:  -     CBC with Auto Differential; Future  -     Comprehensive Metabolic Panel; Future  -     TSH; Future  -     T4, Free; Future  Other fatigue  Assessment & Plan:   Uncontrolled, continue current plan pending work up below  Orders:  -     TSH; Future  -     T4, Free; Future  Prostate cancer screening  -     PSA Screening; Future      Recommendations for Preventive Services Due: see orders and patient instructions/AVS.  Recommended screening schedule for the next 5-10 years is provided to the patient in written form: see Patient Instructions/AVS.     Return in 2 months (on 6/4/2022) for Medicare Annual Wellness Visit in 1 year. Subjective        The following acute and/or chronic problems were also addressed today:    See above     Patient's complete Health Risk Assessment and screening values have been reviewed and are found in Flowsheets. The following problems were reviewed today and where indicated follow up appointments were made and/or referrals ordered.     Positive Risk Factor Screenings with Interventions:       Depression Interventions:  · All discussed importance of physical activity and self-care as means of helping treat depression as traditional medications and counseling are declined by patient  · Patient declines any further evaluation/treatment for this issue            General Health and ACP:  General  In general, how would you Safety:  Do you have working smoke detectors?: Yes  Do you have any tripping hazards - loose or unsecured carpets or rugs?: (!) Yes  Do you have any tripping hazards - clutter in doorways, halls, or stairs?: No  Do you have either shower bars, grab bars, non-slip mats or non-slip surfaces in your shower or bathtub?: Yes  Do all of your stairways have a railing or banister?: (!) No  Do you always fasten your seatbelt when you are in a car?: Yes    Safety Interventions:  · Home safety tips provided  · Patient declines any further evaluation/treatment for this issue           Objective   Vitals:    04/04/22 1130   BP: 116/74   Site: Left Upper Arm   Position: Sitting   Cuff Size: Large Adult   Pulse: 69   Resp: 16   Temp: 97.4 °F (36.3 °C)   TempSrc: Temporal   SpO2: 98%   Weight: 227 lb 12.8 oz (103.3 kg)   Height: 5' 9\" (1.753 m)      Body mass index is 33.64 kg/m².         General Appearance: alert and oriented to person, place and time, well-developed and well-nourished, in no acute distress  Skin: warm and dry, no rash or erythema  Head: normocephalic and atraumatic  Eyes: pupils equal, round, and reactive to light, extraocular eye movements intact, conjunctivae normal  ENT: tympanic membrane, external ear and ear canal normal bilaterally, oropharynx clear and moist with normal mucous membranes  Neck: neck supple and non tender without mass, no thyromegaly or thyroid nodules, no cervical lymphadenopathy   Pulmonary/Chest: clear to auscultation bilaterally- no wheezes, rales or rhonchi, normal air movement, no respiratory distress  Cardiovascular: normal rate, normal S1 and S2 and no gallops  Abdomen: soft, non-tender, non-distended, normal bowel sounds, no masses or organomegaly  Extremities: no cyanosis and no clubbing  Musculoskeletal: normal range of motion, no joint swelling, deformity or tenderness  Neurologic: gait and coordination normal and speech normal       No Known Allergies  Prior to Visit Medications    Medication Sig Taking?  Authorizing Provider   fluticasone (FLONASE) 50 MCG/ACT nasal spray 2 sprays by Nasal route daily Yes Crystal Washington MD   vitamin B-12 (CYANOCOBALAMIN) 1000 MCG tablet Take 2,500 mcg by mouth 2 times daily  Yes Historical Provider, MD   folic acid (FOLVITE) 429 MCG tablet Take 1 mg by mouth 2 times daily  Yes Historical Provider, MD   vitamin B-6 (PYRIDOXINE) 50 MG tablet Take 50 mg by mouth 2 times daily  Yes Historical Provider, MD   vitamin E 400 UNIT capsule Take 400 Units by mouth daily Yes Historical Provider, MD   Coenzyme Q10 (CO Q 10) 10 MG CAPS Take 300 mg by mouth daily  Yes Historical Provider, MD   lisinopril-hydroCHLOROthiazide (PRINZIDE;ZESTORETIC) 20-12.5 MG per tablet Eliseo Harris Yes Crystal Washington MD       [facility] (Including outside providers/suppliers regularly involved in providing care):   Patient Care Team:  Crystal Washington MD as PCP - General (Family Medicine)  Crystal Washington MD as PCP - [facility] Empaneled Provider  Shelli Nguyen MD as Consulting Physician (Pulmonology)    Reviewed and updated this visit:  Tobacco  Allergies  Meds  Med Hx  Surg Hx  Soc Hx  Fam Hx

## 2022-04-04 NOTE — PROGRESS NOTES
Depression screening done  Financial resource strain done  Chief Complaint   Patient presents with   [facility] AW[facility]

## 2022-04-04 NOTE — PATIENT INSTRUCTIONS
DASH Diet: Care Instructions  Your Care Instructions     The DASH diet is an eating plan that can help lower your blood pressure. DASH stands for Dietary Approaches to Stop Hypertension. Hypertension is high bloodpressure. The DASH diet focuses on eating foods that are high in calcium, potassium, and magnesium. These nutrients can lower blood pressure. The foods that are highest in these nutrients are fruits, vegetables, low-fat dairy products, nuts, seeds, and legumes. But taking calcium, potassium, and magnesium supplements instead of eating foods that are high in those nutrients does not have the same effect. The DASH diet also includes whole grains, fish, and poultry. The DASH diet is one of several lifestyle changes your doctor may recommend to lower your high blood pressure. Your doctor may also want you to decrease the amount of sodium in your diet. Lowering sodium while following the DASH dietcan lower blood pressure even further than just the DASH diet alone. Follow-up care is a key part of your treatment and safety. Be sure to make and go to all appointments, and call your doctor if you are having problems. It's also a good idea to know your test results and keep alist of the medicines you take. How can you care for yourself at home? Following the DASH diet   Eat 4 to 5 servings of fruit each day. A serving is 1 medium-sized piece of fruit, ½ cup chopped or canned fruit, 1/4 cup dried fruit, or 4 ounces (½ cup) of fruit juice. Choose fruit more often than fruit juice.  Eat 4 to 5 servings of vegetables each day. A serving is 1 cup of lettuce or raw leafy vegetables, ½ cup of chopped or cooked vegetables, or 4 ounces (½ cup) of vegetable juice. Choose vegetables more often than vegetable juice.  Get 2 to 3 servings of low-fat and fat-free dairy each day. A serving is 8 ounces of milk, 1 cup of yogurt, or 1 ½ ounces of cheese.  Eat 6 to 8 servings of grains each day.  A serving is 1 slice of bread, 1 ounce of dry cereal, or ½ cup of cooked rice, pasta, or cooked cereal. Try to choose whole-grain products as much as possible.  Limit lean meat, poultry, and fish to 2 servings each day. A serving is 3 ounces, about the size of a deck of cards.  Eat 4 to 5 servings of nuts, seeds, and legumes (cooked dried beans, lentils, and split peas) each week. A serving is 1/3 cup of nuts, 2 tablespoons of seeds, or ½ cup of cooked beans or peas.  Limit fats and oils to 2 to 3 servings each day. A serving is 1 teaspoon of vegetable oil or 2 tablespoons of salad dressing.  Limit sweets and added sugars to 5 servings or less a week. A serving is 1 tablespoon jelly or jam, ½ cup sorbet, or 1 cup of lemonade.  Eat less than 2,300 milligrams (mg) of sodium a day. If you limit your sodium to 1,500 mg a day, you can lower your blood pressure even more.  Be aware that all of these are the suggested number of servings for people who eat 1,800 to 2,000 calories a day. Your recommended number of servings may be different if you need more or fewer calories. Tips for success   Start small. Do not try to make dramatic changes to your diet all at once. You might feel that you are missing out on your favorite foods and then be more likely to not follow the plan. Make small changes, and stick with them. Once those changes become habit, add a few more changes.  Try some of the following:  ? Make it a goal to eat a fruit or vegetable at every meal and at snacks. This will make it easy to get the recommended amount of fruits and vegetables each day. ? Try yogurt topped with fruit and nuts for a snack or healthy dessert. ? Add lettuce, tomato, cucumber, and onion to sandwiches. ? Combine a ready-made pizza crust with low-fat mozzarella cheese and lots of vegetable toppings. Try using tomatoes, squash, spinach, broccoli, carrots, cauliflower, and onions. ?  Have a variety of cut-up vegetables with a low-fat dip as an appetizer instead of chips and dip. ? Sprinkle sunflower seeds or chopped almonds over salads. Or try adding chopped walnuts or almonds to cooked vegetables. ? Try some vegetarian meals using beans and peas. Add garbanzo or kidney beans to salads. Make burritos and tacos with mashed berg beans or black beans. Where can you learn more? Go to https://Nomikupepiceweb.Zyngenia. org and sign in to your Q-go account. Enter M431 in the [employer] box to learn more about \"DASH Diet: Care Instructions. \"     If you do not have an account, please click on the \"Sign Up Now\" link. Current as of: January 10, 2022               Content Version: 13.2  © 2006-2022 Healthwise, [employer]. Care instructions adapted under license by [facility] ([facility]). If you have questions about a medical condition or this instruction, always ask your healthcare professional. [place] [address] any warranty or liability for your use of this information. Eating Healthy Foods: Care Instructions  Your Care Instructions     Eating healthy foods can help lower your risk for disease. Healthy food gives you energy and keeps your heart strong, your brain active, your musclesworking, and your bones strong. A healthy diet includes a variety of foods from the basic food groups: grains, vegetables, fruits, milk and milk products, and meat and beans. Some people may eat more of their favorite foods from only one food group and, as a result, miss getting the nutrients they need. So, it is important to pay attention not only to what you eat but also to what you are missing from your diet. You caneat a healthy, balanced diet by making a few small changes. Follow-up care is a key part of your treatment and safety. Be sure to make and go to all appointments, and call your doctor if you are having problems. It's also a good idea to know your test results and keep alist of the medicines you take.   How can you care for yourself at home? Look at what you eat   Keep a food diary for a week or two and record everything you eat or drink. Track the number of servings you eat from each food group.  For a balanced diet every day, eat a variety of:  ? 6 or more ounce-equivalents of grains, such as cereals, breads, crackers, rice, or pasta, every day. An ounce-equivalent is 1 slice of bread, 1 cup of ready-to-eat cereal, or ½ cup of cooked rice, cooked pasta, or cooked cereal.  ? 2½ cups of vegetables, especially:  - Dark-green vegetables such as broccoli and spinach.  - Orange vegetables such as carrots and sweet potatoes. - Dry beans (such as berg and kidney beans) and peas (such as lentils). ? 2 cups of fresh, frozen, or canned fruit. A small apple or 1 banana or orange equals 1 cup. ? 3 cups of nonfat or low-fat milk, yogurt, or other milk products. ? 5½ ounces of meat and beans, such as chicken, fish, lean meat, beans, nuts, and seeds. One egg, 1 tablespoon of peanut butter, ½ ounce nuts or seeds, or ¼ cup of cooked beans equals 1 ounce of meat.  Learn how to read food labels for serving sizes and ingredients. Fast-food and convenience-food meals often contain few or no fruits or vegetables. Make sure you eat some fruits and vegetables to make the meal more nutritious.  Look at your food diary. For each food group, add up what you have eaten and then divide the total by the number of days. This will give you an idea of how much you are eating from each food group. See if you can find some ways to change your diet to make it more healthy. Start small   Do not try to make dramatic changes to your diet all at once. You might feel that you are missing out on your favorite foods and then be more likely to fail.  Start slowly, and gradually change your habits. Try some of the following:  ? Use whole wheat bread instead of white bread. ? Use nonfat or low-fat milk instead of whole milk.   ? Eat brown rice instead of white rice, and eat whole wheat pasta instead of white-flour pasta. ? Try low-fat cheeses and low-fat yogurt. ? Add more fruits and vegetables to meals and have them for snacks. ? Add lettuce, tomato, cucumber, and onion to sandwiches. ? Add fruit to yogurt and cereal.  Enjoy food   You can still eat your favorite foods. You just may need to eat less of them. If your favorite foods are high in fat, salt, and sugar, limit how often you eat them, but do not cut them out entirely.  Eat a wide variety of foods. Make healthy choices when eating out   The type of restaurant you choose can help you make healthy choices. Even fast-food chains are now offering more low-fat or healthier choices on the menu.  Choose smaller portions, or take half of your meal home.  When eating out, try:  ? A veggie pizza with a whole wheat crust or grilled chicken (instead of sausage or pepperoni). ? Pasta with roasted vegetables, grilled chicken, or marinara sauce instead of cream sauce. ? A vegetable wrap or grilled chicken wrap. ? Broiled or poached food instead of fried or breaded items. Make healthy choices easy   Buy packaged, prewashed, ready-to-eat fresh vegetables and fruits, such as baby carrots, salad mixes, and chopped or shredded broccoli and cauliflower.  Buy packaged, presliced fruits, such as melon or pineapple.  Choose 100% fruit or vegetable juice instead of soda. Limit juice intake to 4 to 6 oz (½ to ¾ cup) a day.  Blend low-fat yogurt, fruit juice, and canned or frozen fruit to make a smoothie for breakfast or a snack. Where can you learn more? Go to https://chpepiceweb.Mindlikes. org and sign in to your Shiny [employer] account. Enter G860 in the [employer] box to learn more about \"Eating Healthy Foods: Care Instructions. \"     If you do not have an account, please click on the \"Sign Up Now\" link.   Current as of: September 8, 2021               Content Version: 13.2  © 6127-2955 Healthwise, Incorporated. Care instructions adapted under license by [facility] ([facility]). If you have questions about a medical condition or this instruction, always ask your healthcare professional. [address] any warranty or liability for your use of this information. Personalized Preventive Plan for Navdeep Rincon - 4/4/2022  Medicare offers a range of preventive health benefits. Some of the tests and screenings are paid in full while other may be subject to a deductible, co-insurance, and/or copay. Some of these benefits include a comprehensive review of your medical history including lifestyle, illnesses that may run in your family, and various assessments and screenings as appropriate. After reviewing your medical record and screening and assessments performed today your provider may have ordered immunizations, labs, imaging, and/or referrals for you. A list of these orders (if applicable) as well as your Preventive Care list are included within your After Visit Summary for your review. Other Preventive Recommendations:    · A preventive eye exam performed by an eye specialist is recommended every 1-2 years to screen for glaucoma; cataracts, macular degeneration, and other eye disorders. · A preventive dental visit is recommended every 6 months. · Try to get at least 150 minutes of exercise per week or 10,000 steps per day on a pedometer . · Order or download the FREE \"Exercise & Physical Activity: Your Everyday Guide\" from The [employer]. Call 4-174.295.6597 or search [employer] online. · You need 9474-5789 mg of calcium and 6390-5340 IU of vitamin D per day. It is possible to meet your calcium requirement with diet alone, but a vitamin D supplement is usually necessary to meet this goal.  · When exposed to the sun, use a sunscreen that protects against both UVA and UVB radiation with an SPF of 30 or greater.  Reapply every 2 to 3 hours or after sweating, drying off with a towel, or swimming. · Always wear a seat belt when traveling in a car. Always wear a helmet when riding a bicycle or motorcycle.

## 2022-04-30 PROBLEM — R53.83 OTHER FATIGUE: Status: ACTIVE | Noted: 2022-04-30

## 2022-04-30 PROBLEM — R73.03 PRE-DIABETES: Status: ACTIVE | Noted: 2022-04-30

## 2022-04-30 NOTE — ASSESSMENT & PLAN NOTE
Well-controlled, continue current treatment plan     Results for orders placed or performed in visit on 04/04/22   POCT glycosylated hemoglobin (Hb A1C)   Result Value Ref Range    Hemoglobin A1C 5.5 %

## 2022-04-30 NOTE — ASSESSMENT & PLAN NOTE
Well-controlled, changes made today: Per patient's request we will discontinue amlodipine and reevaluate in May with plans to decrease lisinopril further if blood pressure remains within normal limits.

## 2022-05-06 DIAGNOSIS — I10 ESSENTIAL HYPERTENSION: ICD-10-CM

## 2022-05-09 RX ORDER — AMLODIPINE BESYLATE 5 MG/1
TABLET ORAL
Qty: 90 TABLET | Refills: 3 | OUTPATIENT
Start: 2022-05-09

## 2022-06-06 DIAGNOSIS — I10 ESSENTIAL HYPERTENSION: ICD-10-CM

## 2022-06-06 RX ORDER — LISINOPRIL AND HYDROCHLOROTHIAZIDE 20; 12.5 MG/1; MG/1
TABLET ORAL
Qty: 90 TABLET | Refills: 4 | Status: SHIPPED | OUTPATIENT
Start: 2022-06-06 | End: 2022-07-05 | Stop reason: ALTCHOICE

## 2022-06-28 ENCOUNTER — OFFICE VISIT (OUTPATIENT)
Dept: FAMILY MEDICINE CLINIC | Age: 67
End: 2022-06-28
Payer: MEDICARE

## 2022-06-28 VITALS
HEIGHT: 69 IN | DIASTOLIC BLOOD PRESSURE: 69 MMHG | BODY MASS INDEX: 31.76 KG/M2 | WEIGHT: 214.4 LBS | HEART RATE: 63 BPM | TEMPERATURE: 97.5 F | OXYGEN SATURATION: 96 % | SYSTOLIC BLOOD PRESSURE: 115 MMHG

## 2022-06-28 DIAGNOSIS — E78.6 LOW HDL (UNDER 40): ICD-10-CM

## 2022-06-28 DIAGNOSIS — M15.9 PRIMARY OSTEOARTHRITIS INVOLVING MULTIPLE JOINTS: ICD-10-CM

## 2022-06-28 DIAGNOSIS — R73.03 PRE-DIABETES: ICD-10-CM

## 2022-06-28 DIAGNOSIS — I10 ESSENTIAL HYPERTENSION: Primary | ICD-10-CM

## 2022-06-28 PROCEDURE — 99213 OFFICE O/P EST LOW 20 MIN: CPT | Performed by: NURSE PRACTITIONER

## 2022-06-28 PROCEDURE — 3017F COLORECTAL CA SCREEN DOC REV: CPT | Performed by: NURSE PRACTITIONER

## 2022-06-28 PROCEDURE — 1036F TOBACCO NON-USER: CPT | Performed by: NURSE PRACTITIONER

## 2022-06-28 PROCEDURE — 1123F ACP DISCUSS/DSCN MKR DOCD: CPT | Performed by: NURSE PRACTITIONER

## 2022-06-28 PROCEDURE — G8427 DOCREV CUR MEDS BY ELIG CLIN: HCPCS | Performed by: NURSE PRACTITIONER

## 2022-06-28 PROCEDURE — G8417 CALC BMI ABV UP PARAM F/U: HCPCS | Performed by: NURSE PRACTITIONER

## 2022-06-28 RX ORDER — LISINOPRIL 10 MG/1
10 TABLET ORAL DAILY
Qty: 90 TABLET | Refills: 1 | Status: SHIPPED | OUTPATIENT
Start: 2022-06-28

## 2022-06-28 SDOH — ECONOMIC STABILITY: FOOD INSECURITY: WITHIN THE PAST 12 MONTHS, YOU WORRIED THAT YOUR FOOD WOULD RUN OUT BEFORE YOU GOT MONEY TO BUY MORE.: NEVER TRUE

## 2022-06-28 SDOH — ECONOMIC STABILITY: FOOD INSECURITY: WITHIN THE PAST 12 MONTHS, THE FOOD YOU BOUGHT JUST DIDN'T LAST AND YOU DIDN'T HAVE MONEY TO GET MORE.: NEVER TRUE

## 2022-06-28 ASSESSMENT — PATIENT HEALTH QUESTIONNAIRE - PHQ9
SUM OF ALL RESPONSES TO PHQ QUESTIONS 1-9: 0
1. LITTLE INTEREST OR PLEASURE IN DOING THINGS: 0
SUM OF ALL RESPONSES TO PHQ QUESTIONS 1-9: 0
SUM OF ALL RESPONSES TO PHQ QUESTIONS 1-9: 0
SUM OF ALL RESPONSES TO PHQ9 QUESTIONS 1 & 2: 0
2. FEELING DOWN, DEPRESSED OR HOPELESS: 0
SUM OF ALL RESPONSES TO PHQ QUESTIONS 1-9: 0

## 2022-06-28 ASSESSMENT — SOCIAL DETERMINANTS OF HEALTH (SDOH): HOW HARD IS IT FOR YOU TO PAY FOR THE VERY BASICS LIKE FOOD, HOUSING, MEDICAL CARE, AND HEATING?: NOT HARD AT ALL

## 2022-06-28 NOTE — PROGRESS NOTES
[facility] [address] [facility] [address]  Dept: [phone]    6/28/2022    CHIEF COMPLAINT    Chief Complaint   Patient presents with    Hypertension       HPI    Alli Capellan is a 79 y.o. male who presents   Chief Complaint   Patient presents with    Hypertension   . Appointment to f/u on HTN. Hypertension- Continues taking lisinopril 20-hydrochlorothiazide 12.5 mg. During his Medicare annual wellness visit April amlodipine 5 mg was discontinued. Patient continues to deny chest pain, dizziness, shortness of breath and leg swelling. He has lost 14 pounds since his appointment like to continue to reduce his blood pressure medications due to a blood pressure of 115/69 today. He would also like to discuss lab work that was completed in April, but was not received by this office until today. Shayy Andrea called [address] and they said \"something wasn't clicked correctly to allow sharing in epic\". Reviewed lab work with patient. Thyroid labs, CBC, prostate and CHEM panel all within normal limits. Total cholesterol within normal limits, however triglycerides remain elevated at 181. HDL is chronically low at 31 and cholesterol to HDL ratio is elevated at 5.1. Overall cholesterol is stable in comparison to December 2020 labs. Not taking cholesterol medication at this time. Fasting blood sugar slightly elevated at 105. His hemoglobin A1c in April 2022 was 5.5. Hypertension  Pertinent negatives include no chest pain, headaches, palpitations or shortness of breath.          Vitals:    06/28/22 1458   BP: 115/69   Site: Left Upper Arm   Position: Sitting   Cuff Size: Large Adult   Pulse: 63   Temp: 97.5 °F (36.4 °C)   TempSrc: Temporal   SpO2: 96%   Weight: 214 lb 6.4 oz (97.3 kg)   Height: 5' 9\" (1.753 m)       Wt Readings from Last 3 Encounters:   06/28/22 214 lb 6.4 oz (97.3 kg)   04/04/22 227 lb 12.8 oz (103.3 kg) 01/11/22 225 lb (102.1 kg)     BP Readings from Last 3 Encounters:   06/28/22 115/69   04/04/22 116/74   01/11/22 114/73       REVIEW OF SYSTEMS    Review of Systems   Constitutional: Negative. Negative for chills and fever. HENT: Negative. Eyes: Negative. Negative for visual disturbance. Respiratory: Negative. Negative for cough and shortness of breath. Cardiovascular: Negative. Negative for chest pain, palpitations and leg swelling. Gastrointestinal: Negative. Genitourinary: Negative. Negative for difficulty urinating. Musculoskeletal: Positive for arthralgias (OA pain). Neurological: Negative. Negative for dizziness and headaches. PAST MEDICAL HISTORY    Past Medical History:   Diagnosis Date    Class 3 severe obesity due to excess calories with body mass index (BMI) greater than or equal to 70 in adult [facility])     COVID 11/09/2021    Diverticulosis large intestine w/o perforation or abscess w/bleeding     Hypertension     Obesity     LEISA on CPAP     Osteoarthritis     Seizure ([address] 1976    While in the service. He was on dilantin appoximately 5 -6 years.  No recent seizures    Situational insomnia     Sleep apnea        FAMILY HISTORY    Family History   Problem Relation Age of Onset    Kidney Disease Mother         polycystic kidney    Breast Cancer Mother     Dementia Father     Cancer Brother     Brain Cancer Brother     Lung Cancer Brother     Heart Disease Brother        SOCIAL HISTORY    Social History     Socioeconomic History    Marital status:      Spouse name: None    Number of children: 3    Years of education: None    Highest education level: None   Occupational History    None   Tobacco Use    Smoking status: Never Smoker    Smokeless tobacco: Never Used   Vaping Use    Vaping Use: Never used   Substance and Sexual Activity    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    Drug use: No    Sexual activity: Yes     Partners: Female Other Topics Concern    None   Social History Narrative    None     Social Determinants of Health     Financial Resource Strain: Low Risk     Difficulty of Paying Living Expenses: Not hard at all   Food Insecurity: No Food Insecurity    Worried About Running Out of Food in the Last Year: Never true    Katelynn of Food in the Last Year: Never true   Transportation Needs: No Transportation Needs    Lack of Transportation (Medical): No    Lack of Transportation (Non-Medical):  No   Physical Activity: Sufficiently Active    Days of Exercise per Week: 7 days    Minutes of Exercise per Session: 60 min   Stress:     Feeling of Stress : Not on file   Social Connections:     Frequency of Communication with Friends and Family: Not on file    Frequency of Social Gatherings with Friends and Family: Not on file    Attends Congregational Services: Not on file    Active Member of Clubs or Organizations: Not on file    Attends Club or Organization Meetings: Not on file    Marital Status: Not on file   Intimate Partner Violence:     Fear of Current or Ex-Partner: Not on file    Emotionally Abused: Not on file    Physically Abused: Not on file    Sexually Abused: Not on file   Housing Stability:     Unable to Pay for Housing in the Last Year: Not on file    Number of [place] in the Last Year: Not on file    Unstable Housing in the Last Year: Not on file       SURGICAL HISTORY    Past Surgical History:   Procedure Laterality Date    [facility]    EYE SURGERY      as a child, eye extrusion    JOINT REPLACEMENT Right 2018    knee    KNEE ARTHROSCOPY Left 2010       CURRENT MEDICATIONS    Current Outpatient Medications   Medication Sig Dispense Refill    lisinopril (PRINIVIL;ZESTRIL) 10 MG tablet Take 1 tablet by mouth daily 90 tablet 1    fluticasone (FLONASE) 50 MCG/ACT nasal spray 2 sprays by Nasal route daily 16 g 3    vitamin B-12 (CYANOCOBALAMIN) 1000 MCG tablet Take 2,500 mcg by mouth 2 times daily       folic acid (FOLVITE) 356 MCG tablet Take 1 mg by mouth daily       vitamin B-6 (PYRIDOXINE) 50 MG tablet Take 50 mg by mouth daily       vitamin E 400 UNIT capsule Take 400 Units by mouth daily      Coenzyme Q10 (CO Q 10) 10 MG CAPS Take 300 mg by mouth daily        No current facility-administered medications for this visit. ALLERGIES    No Known Allergies    PHYSICAL EXAM   Physical Exam  Vitals and nursing note reviewed. Constitutional:       General: He is not in acute distress. Appearance: He is well-developed. He is not diaphoretic. Interventions: Face mask in place. HENT:      Head: Normocephalic. Right Ear: Hearing normal.      Left Ear: Hearing normal.   Eyes:      General:         Right eye: No discharge. Left eye: No discharge. Pupils: Pupils are equal.   Cardiovascular:      Rate and Rhythm: Normal rate and regular rhythm. Pulses: Normal pulses. Radial pulses are 2+ on the right side and 2+ on the left side. Heart sounds: Normal heart sounds, S1 normal and S2 normal. No murmur heard. Pulmonary:      Effort: Pulmonary effort is normal. No respiratory distress. Breath sounds: Normal breath sounds. No wheezing. Musculoskeletal:      Cervical back: Normal range of motion. Skin:     General: Skin is warm and dry. Neurological:      Mental Status: He is alert and oriented to person, place, and time. Psychiatric:         Behavior: Behavior normal. Behavior is cooperative. ASSESSMENT/PLAN  1. Essential hypertension  -     lisinopril (PRINIVIL;ZESTRIL) 10 MG tablet; Take 1 tablet by mouth daily, Disp-90 tablet, R-1Normal  2. Pre-diabetes  3. Primary osteoarthritis involving multiple joints  4. Low HDL (under 40)    Hypertension stable.   Given weight loss and stability of blood pressure will discontinue combo pill of lisinopril-hydrochlorothiazide 20-12 0.5 and replace this with lisinopril 10 mg.  Patient advised to continue monitoring blood pressure at home and notify office of any elevations. Prediabetes stable based on A1c from April. We will continue to monitor. Continue staying active and taking over-the-counter medications for osteoarthritis. Discussed cholesterol and other lab results. Continue attempting dietary and exercise modifications to keep cholesterol at stable level. No medications ordered at this time. Yesi Arthur received counseling on the following healthy behaviors: nutrition, exercise and medication adherence  Reviewed prior labs and health maintenance  Continue current medications, diet and exercise. Discussed use, benefit, and side effects of prescribed medications. Barriers to medication compliance addressed. Patient given educational materials - see patient instructions  Was a self-tracking handout given in paper form or via [employer]? Yes    Requested Prescriptions     Signed Prescriptions Disp Refills    lisinopril (PRINIVIL;ZESTRIL) 10 MG tablet 90 tablet 1     Sig: Take 1 tablet by mouth daily       All patient questions answered. Patient voiced understanding. Quality Measures    Body mass index is 31.66 kg/m². Elevated. Weight control planned discussed Healthy diet and regular exercise. BP: 115/69. Blood pressure is normal. Treatment plan consists of No treatment change needed. Fall Risk 4/4/2022 11/8/2021 8/4/2020   2 or more falls in past year? no no no   Fall with injury in past year? no no no     The patient does not have a history of falls. I did , complete a risk assessment for falls.  A plan of care for falls No Treatment plan indicated    Lab Results   Component Value Date    LDLCALC 122 04/03/2018    LDLCHOLESTEROL 109 12/31/2020    (goal LDL reduction with dx if diabetes is 50% LDL reduction)    PHQ Scores 6/28/2022 4/4/2022 4/4/2022 5/11/2021 8/4/2020 1/3/2020 4/2/2019   PHQ2 Score 0 - 0 0 0 0 0   PHQ9 Score 0 6 0 0 0 0 0     Interpretation of Total Score Depression Severity: 1-4 = Minimal depression, 5-9 = Mild depression, 10-14 = Moderate depression, 15-19 = Moderately severe depression, 20-27 = Severe depression       Return for Nurse visit for BP check and 3 month BP follow-up .     (Please note that portions of this note were completed with a voice recognition program. Efforts were made to edit the dictations but occasionally words are mis-transcribed.)      Electronically signed by REBECCA Dueñas CNP on 6/28/22 at 3:58 PM EDT

## 2022-06-28 NOTE — Clinical Note
Patient saw me for medicare wellness and for BP f/u after d/c amlodipine. He requested further reduction of blood pressure medications. Please review and see if you agree.  He will get BP check when in office with Holli Valente

## 2022-07-05 ASSESSMENT — ENCOUNTER SYMPTOMS
EYES NEGATIVE: 1
RESPIRATORY NEGATIVE: 1
COUGH: 0
SHORTNESS OF BREATH: 0
GASTROINTESTINAL NEGATIVE: 1

## 2022-07-11 ENCOUNTER — OFFICE VISIT (OUTPATIENT)
Dept: PULMONOLOGY | Age: 67
End: 2022-07-11
Payer: MEDICARE

## 2022-07-11 VITALS
SYSTOLIC BLOOD PRESSURE: 113 MMHG | BODY MASS INDEX: 31.99 KG/M2 | HEIGHT: 69 IN | DIASTOLIC BLOOD PRESSURE: 68 MMHG | RESPIRATION RATE: 16 BRPM | OXYGEN SATURATION: 97 % | WEIGHT: 216 LBS | TEMPERATURE: 97 F | HEART RATE: 67 BPM

## 2022-07-11 DIAGNOSIS — E66.09 CLASS 1 OBESITY DUE TO EXCESS CALORIES WITH SERIOUS COMORBIDITY AND BODY MASS INDEX (BMI) OF 31.0 TO 31.9 IN ADULT: ICD-10-CM

## 2022-07-11 DIAGNOSIS — G47.33 OSA ON CPAP: Primary | ICD-10-CM

## 2022-07-11 DIAGNOSIS — Z99.89 OSA ON CPAP: Primary | ICD-10-CM

## 2022-07-11 PROCEDURE — 1123F ACP DISCUSS/DSCN MKR DOCD: CPT | Performed by: INTERNAL MEDICINE

## 2022-07-11 PROCEDURE — G8417 CALC BMI ABV UP PARAM F/U: HCPCS | Performed by: INTERNAL MEDICINE

## 2022-07-11 PROCEDURE — 1036F TOBACCO NON-USER: CPT | Performed by: INTERNAL MEDICINE

## 2022-07-11 PROCEDURE — G8427 DOCREV CUR MEDS BY ELIG CLIN: HCPCS | Performed by: INTERNAL MEDICINE

## 2022-07-11 PROCEDURE — 99213 OFFICE O/P EST LOW 20 MIN: CPT | Performed by: INTERNAL MEDICINE

## 2022-07-11 PROCEDURE — 3017F COLORECTAL CA SCREEN DOC REV: CPT | Performed by: INTERNAL MEDICINE

## 2022-07-11 ASSESSMENT — ENCOUNTER SYMPTOMS
EYES NEGATIVE: 1
RESPIRATORY NEGATIVE: 1

## 2022-07-12 NOTE — PROGRESS NOTES
Subjective:      Patient ID: Low Jacobs is a 79 y.o. male being seen in my clinic for   Chief Complaint   Patient presents with    Sleep Apnea     Follow up        HPI  Patient returns for follow up of sleep apnea. Since last visit 12 months ago, patient has been very compliant with CPAP. Uses every night, for the entire night. Reports refreshing and restorative sleep. Denies snoring. Reports normal daytime alertness. Believes benefiting greatly. Compliance download from 4/12/22 to 7/10/22 shows 98% compliance for average of 7.5hrs per night. Residual AHI 3.1. CPAP [id number] cm H2O. Review of Systems   Constitutional: Negative. HENT: Negative. Eyes: Negative. Respiratory: Negative. Cardiovascular: Negative. All other systems reviewed and are negative. Objective:     Vitals:    07/11/22 1505   BP: 113/68   Site: Left Upper Arm   Pulse: 67   Resp: 16   Temp: 97 °F (36.1 °C)   SpO2: 97%  Comment: Room air at rest   Weight: 216 lb (98 kg)   Height: 5' 9\" (1.753 m)     Current Outpatient Medications   Medication Sig Dispense Refill    lisinopril (PRINIVIL;ZESTRIL) 10 MG tablet Take 1 tablet by mouth daily 90 tablet 1    fluticasone (FLONASE) 50 MCG/ACT nasal spray 2 sprays by Nasal route daily 16 g 3    vitamin B-12 (CYANOCOBALAMIN) 1000 MCG tablet Take 2,500 mcg by mouth 2 times daily       folic acid (FOLVITE) 777 MCG tablet Take 1 mg by mouth daily       vitamin B-6 (PYRIDOXINE) 50 MG tablet Take 50 mg by mouth daily       vitamin E 400 UNIT capsule Take 400 Units by mouth daily      Coenzyme Q10 (CO Q 10) 10 MG CAPS Take 300 mg by mouth daily        No current facility-administered medications for this visit. Physical Exam  Vitals and nursing note reviewed. Constitutional:       Appearance: He is well-developed. He is obese. HENT:      Mouth/Throat:      Comments: Large tongue , low hanging soft palate and large uvula.  Overall pharyngeal orifice moderately decreased    Eyes:      General: No scleral icterus. Conjunctiva/sclera: Conjunctivae normal.   Neck:      Thyroid: No thyromegaly. Vascular: No JVD. Trachea: No tracheal deviation. Cardiovascular:      Rate and Rhythm: Normal rate and regular rhythm. Heart sounds: Normal heart sounds. No murmur heard. No gallop. Pulmonary:      Effort: Pulmonary effort is normal. No respiratory distress. Breath sounds: No wheezing or rales. Chest:      Chest wall: No tenderness. Abdominal:      Palpations: Abdomen is soft. Tenderness: There is no abdominal tenderness. Musculoskeletal:      Cervical back: Neck supple. Lymphadenopathy:      Cervical: No cervical adenopathy. Skin:     General: Skin is warm and dry. Neurological:      Mental Status: He is alert and oriented to person, place, and time. Wt Readings from Last 3 Encounters:   07/11/22 216 lb (98 kg)   06/28/22 214 lb 6.4 oz (97.3 kg)   04/04/22 227 lb 12.8 oz (103.3 kg)     Results for orders placed or performed in visit on 04/04/22   POCT glycosylated hemoglobin (Hb A1C)   Result Value Ref Range    Hemoglobin A1C 5.5 %       :      1. LEISA on CPAP    2. Class 1 obesity due to excess calories with serious comorbidity and body mass index (BMI) of 31.0 to 31.9 in adult      Patient Active Problem List   Diagnosis    Primary osteoarthritis involving multiple joints    Obesity    Chronic pain of both shoulders    Low HDL (under 40)    Benign colon polyp    Diverticulosis large intestine w/o perforation or abscess w/o bleeding    History of seizure    Essential hypertension    LEISA on CPAP    COVID    COVID-19    S/P TKR (total knee replacement) using cement, left    Osteoarthritis    Left knee DJD    Pre-diabetes    Other fatigue         Plan:      1. Encourage CPAP use   2. Avoid sedative hypnotics and alcohol at bedtime  3. Weight loss  4.  Exercise caution when driving and other high risk activities of not adequately treated for sleep apnea  5. Instructed to bring CPAP machine for use post op  6. RTC 1y    No orders of the defined types were placed in this encounter. No orders of the defined types were placed in this encounter. Return in about 1 year (around 7/11/2023).        Electronically signed by Ankur Bernstein DO on 7/11/2022at 10:42 PM

## 2022-08-18 ENCOUNTER — CARE COORDINATION (OUTPATIENT)
Dept: CARE COORDINATION | Age: 67
End: 2022-08-18

## 2022-08-18 ENCOUNTER — TELEPHONE (OUTPATIENT)
Dept: FAMILY MEDICINE CLINIC | Age: 67
End: 2022-08-18

## 2022-08-18 DIAGNOSIS — H10.33 ACUTE BACTERIAL CONJUNCTIVITIS OF BOTH EYES: ICD-10-CM

## 2022-08-18 DIAGNOSIS — H10.33 ACUTE BACTERIAL CONJUNCTIVITIS OF BOTH EYES: Primary | ICD-10-CM

## 2022-08-18 RX ORDER — GENTAMICIN SULFATE 3 MG/ML
1 SOLUTION/ DROPS OPHTHALMIC EVERY 4 HOURS
Qty: 5 ML | Refills: 0 | Status: SHIPPED | OUTPATIENT
Start: 2022-08-18 | End: 2022-08-18 | Stop reason: SDUPTHER

## 2022-08-18 RX ORDER — GENTAMICIN SULFATE 3 MG/ML
1 SOLUTION/ DROPS OPHTHALMIC EVERY 4 HOURS
Qty: 5 ML | Refills: 0 | Status: SHIPPED | OUTPATIENT
Start: 2022-08-18 | End: 2022-08-25

## 2022-08-18 NOTE — TELEPHONE ENCOUNTER
----- Message from Yudy Myers sent at 8/18/2022  9:51 AM EDT -----  Subject: Message to Provider    QUESTIONS  Information for Provider? Patient would like to know if there is anything   he can take over the counter for pink eye. Please call patient and advise. Thanks.  ---------------------------------------------------------------------------  --------------  Nubia Palacio [facility]  [phone]; OK to leave message on voicemail  ---------------------------------------------------------------------------  --------------  SCRIPT ANSWERS  Relationship to Patient?  Self

## 2022-08-18 NOTE — TELEPHONE ENCOUNTER
Not that I am aware of. He could ask his pharmacist.  I did send in an antibiotic solution to the correct pharmacy.

## 2022-11-18 ENCOUNTER — TELEPHONE (OUTPATIENT)
Dept: PULMONOLOGY | Age: 67
End: 2022-11-18

## 2022-11-18 DIAGNOSIS — G47.33 OSA ON CPAP: Primary | ICD-10-CM

## 2022-11-18 DIAGNOSIS — Z99.89 OSA ON CPAP: Primary | ICD-10-CM

## 2022-12-20 DIAGNOSIS — I10 ESSENTIAL HYPERTENSION: ICD-10-CM

## 2022-12-20 RX ORDER — LISINOPRIL 10 MG/1
10 TABLET ORAL DAILY
Qty: 90 TABLET | Refills: 3 | Status: SHIPPED | OUTPATIENT
Start: 2022-12-20

## 2023-01-17 ENCOUNTER — OFFICE VISIT (OUTPATIENT)
Dept: FAMILY MEDICINE CLINIC | Age: 68
End: 2023-01-17
Payer: MEDICARE

## 2023-01-17 VITALS
WEIGHT: 221.4 LBS | HEART RATE: 72 BPM | SYSTOLIC BLOOD PRESSURE: 112 MMHG | DIASTOLIC BLOOD PRESSURE: 78 MMHG | BODY MASS INDEX: 32.7 KG/M2

## 2023-01-17 DIAGNOSIS — I10 ESSENTIAL HYPERTENSION: Primary | ICD-10-CM

## 2023-01-17 DIAGNOSIS — E78.6 LOW HDL (UNDER 40): ICD-10-CM

## 2023-01-17 DIAGNOSIS — R73.03 PRE-DIABETES: ICD-10-CM

## 2023-01-17 DIAGNOSIS — F41.9 ANXIETY: ICD-10-CM

## 2023-01-17 DIAGNOSIS — Z12.5 SCREENING PSA (PROSTATE SPECIFIC ANTIGEN): ICD-10-CM

## 2023-01-17 DIAGNOSIS — M15.9 PRIMARY OSTEOARTHRITIS INVOLVING MULTIPLE JOINTS: ICD-10-CM

## 2023-01-17 LAB — HBA1C MFR BLD: 5.6 %

## 2023-01-17 PROCEDURE — 83036 HEMOGLOBIN GLYCOSYLATED A1C: CPT | Performed by: FAMILY MEDICINE

## 2023-01-17 PROCEDURE — 99214 OFFICE O/P EST MOD 30 MIN: CPT | Performed by: FAMILY MEDICINE

## 2023-01-17 PROCEDURE — 3017F COLORECTAL CA SCREEN DOC REV: CPT | Performed by: FAMILY MEDICINE

## 2023-01-17 PROCEDURE — 1123F ACP DISCUSS/DSCN MKR DOCD: CPT | Performed by: FAMILY MEDICINE

## 2023-01-17 PROCEDURE — G8427 DOCREV CUR MEDS BY ELIG CLIN: HCPCS | Performed by: FAMILY MEDICINE

## 2023-01-17 PROCEDURE — 3074F SYST BP LT 130 MM HG: CPT | Performed by: FAMILY MEDICINE

## 2023-01-17 PROCEDURE — 3078F DIAST BP <80 MM HG: CPT | Performed by: FAMILY MEDICINE

## 2023-01-17 PROCEDURE — G8484 FLU IMMUNIZE NO ADMIN: HCPCS | Performed by: FAMILY MEDICINE

## 2023-01-17 PROCEDURE — 1036F TOBACCO NON-USER: CPT | Performed by: FAMILY MEDICINE

## 2023-01-17 PROCEDURE — G8417 CALC BMI ABV UP PARAM F/U: HCPCS | Performed by: FAMILY MEDICINE

## 2023-01-17 RX ORDER — CLONAZEPAM 0.5 MG/1
0.5 TABLET ORAL 2 TIMES DAILY PRN
Qty: 30 TABLET | Refills: 0 | Status: SHIPPED | OUTPATIENT
Start: 2023-01-17 | End: 2023-02-01

## 2023-01-17 ASSESSMENT — PATIENT HEALTH QUESTIONNAIRE - PHQ9
SUM OF ALL RESPONSES TO PHQ9 QUESTIONS 1 & 2: 0
2. FEELING DOWN, DEPRESSED OR HOPELESS: 0
SUM OF ALL RESPONSES TO PHQ QUESTIONS 1-9: 0
SUM OF ALL RESPONSES TO PHQ QUESTIONS 1-9: 0
1. LITTLE INTEREST OR PLEASURE IN DOING THINGS: 0
SUM OF ALL RESPONSES TO PHQ QUESTIONS 1-9: 0
SUM OF ALL RESPONSES TO PHQ QUESTIONS 1-9: 0

## 2023-01-17 ASSESSMENT — ENCOUNTER SYMPTOMS
DIARRHEA: 0
CONSTIPATION: 0
COUGH: 0
EYES NEGATIVE: 1
ALLERGIC/IMMUNOLOGIC NEGATIVE: 1
ABDOMINAL PAIN: 0
SHORTNESS OF BREATH: 0
BLOOD IN STOOL: 0

## 2023-01-17 NOTE — PROGRESS NOTES
[facility] [address]  [address]  Dept: [phone]    1/17/2023    CHIEF COMPLAINT    Chief Complaint   Patient presents with    Hypertension       HPI    Florence Handy is a 79 y.o. male who presents   Chief Complaint   Patient presents with    Hypertension   . Recheck chronic conditions including htn, pre-diabetes. Having trouble staying asleep. 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 Vitals:    01/17/23 1346   BP: 112/78   Pulse: 72   Weight: 221 lb 6.4 oz (100.4 kg)       REVIEW OF SYSTEMS    Review of Systems   Constitutional:  Positive for fatigue. Negative for fever and unexpected weight change. HENT: Negative. Eyes: Negative. Respiratory:  Negative for cough and shortness of breath. Cardiovascular:  Negative for chest pain and leg swelling. Gastrointestinal:  Negative for abdominal pain, blood in stool, constipation and diarrhea. Endocrine: Negative. Genitourinary:  Negative for frequency and urgency. Musculoskeletal: Negative. Skin: Negative. Allergic/Immunologic: Negative. Neurological:  Negative for dizziness and headaches. Hematological: Negative. Psychiatric/Behavioral:  Positive for sleep disturbance. The patient is not nervous/anxious. PAST MEDICAL HISTORY    Past Medical History:   Diagnosis Date    Class 3 severe obesity due to excess calories with body mass index (BMI) greater than or equal to 70 in adult [facility])     COVID 11/09/2021    Diverticulosis large intestine w/o perforation or abscess w/bleeding     Hypertension     Obesity     LEISA on CPAP     Osteoarthritis     Seizure ([address] 1976    While in the service. He was on dilantin appoximately 5 -6 years.  No recent seizures    Situational insomnia     Sleep apnea        FAMILY HISTORY    Family History   Problem Relation Age of Onset    Kidney Disease Mother         polycystic kidney Breast Cancer Mother     Dementia Father     Cancer Brother     Brain Cancer Brother     Lung Cancer Brother     Heart Disease Brother        SOCIAL HISTORY    Social History     Socioeconomic History    Marital status:      Spouse name: None    Number of children: 3    Years of education: None    Highest education level: None   Tobacco Use    Smoking status: Never    Smokeless tobacco: Never   Vaping Use    Vaping Use: Never used   Substance and Sexual Activity    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    Drug use: No    Sexual activity: Yes     Partners: Female     Social Determinants of Health     Financial Resource Strain: Low Risk     Difficulty of Paying Living Expenses: Not hard at all   Food Insecurity: No Food Insecurity    Worried About [address] in the Last Year: Never true    [address] in the Last Year: Never true   Transportation Needs: No Transportation Needs    Lack of Transportation (Medical): No    Lack of Transportation (Non-Medical): No   Physical Activity: Sufficiently Active    Days of Exercise per Week: 7 days    Minutes of Exercise per Session: 60 min       SURGICAL HISTORY    Past Surgical History:   Procedure Laterality Date    [facility]COPY  [facility]    EYE SURGERY      as a child, eye extrusion    JOINT REPLACEMENT Right 2018    knee    KNEE ARTHROSCOPY Left 2010       CURRENT MEDICATIONS    Current Outpatient Medications   Medication Sig Dispense Refill    MULTIPLE VITAMIN PO Take by mouth      clonazePAM (KLONOPIN) 0.5 MG tablet Take 1 tablet by mouth 2 times daily as needed for Anxiety for up to 15 days.  Max Daily Amount: 1 mg 30 tablet 0    lisinopril (PRINIVIL;ZESTRIL) 10 MG tablet Take 1 tablet by mouth daily 90 tablet 3    vitamin B-12 (CYANOCOBALAMIN) 1000 MCG tablet Take 2,500 mcg by mouth 2 times daily       folic acid (FOLVITE) 347 MCG tablet Take 1 mg by mouth daily       vitamin B-6 (PYRIDOXINE) 50 MG tablet Take 50 mg by mouth daily vitamin E 400 UNIT capsule Take 400 Units by mouth daily      fluticasone (FLONASE) 50 MCG/ACT nasal spray 2 sprays by Nasal route daily 16 g 3    Coenzyme Q10 (CO Q 10) 10 MG CAPS Take 300 mg by mouth daily        No current facility-administered medications for this visit. ALLERGIES    No Known Allergies    PHYSICAL EXAM   Physical Exam  Vitals reviewed. Constitutional:       Appearance: He is well-developed. HENT:      Head: Normocephalic. Eyes:      Pupils: Pupils are equal, round, and reactive to light. Neck:      Thyroid: No thyromegaly. Cardiovascular:      Rate and Rhythm: Normal rate and regular rhythm. Heart sounds: Normal heart sounds. No murmur heard. Pulmonary:      Effort: Pulmonary effort is normal.      Breath sounds: Normal breath sounds. No wheezing or rales. Abdominal:      Palpations: Abdomen is soft. Tenderness: There is no abdominal tenderness. There is no guarding or rebound. Musculoskeletal:         General: Deformity (oa) present. No tenderness. Normal range of motion. Cervical back: Normal range of motion and neck supple. Lymphadenopathy:      Cervical: No cervical adenopathy. Skin:     General: Skin is warm and dry. Neurological:      Mental Status: He is alert and oriented to person, place, and time. Psychiatric:         Mood and Affect: Mood normal.         Behavior: Behavior normal.         Thought Content: Thought content normal.         Judgment: Judgment normal.       ASSESSMENT/PLAN  1. Essential hypertension  Chronic, stable, continue current medication and/or plan    - Comprehensive Metabolic Panel; Future    2. Pre-diabetes  A1C-5.6  - POCT glycosylated hemoglobin (Hb A1C)  - Comprehensive Metabolic Panel; Future    3. Primary osteoarthritis involving multiple joints  Cont activity as tolerated    4. Screening PSA (prostate specific antigen)    - PSA Screening; Future    5.  Low HDL (under 40)  Cont healthy diet  - Lipid Panel; Future    6. Anxiety  Trial of prn med. Try melatonin at night. Discussed getting counseling to help deal with his wife's health condition. - clonazePAM (KLONOPIN) 0.5 MG tablet; Take 1 tablet by mouth 2 times daily as needed for Anxiety for up to 15 days. Max Daily Amount: 1 mg  Dispense: 30 tablet; Refill: 0       Andrés Ayers received counseling on the following healthy behaviors: nutrition, exercise, and medication adherence  Reviewed prior labs and health maintenance. Continue current medications, diet and exercise. Discussed use, benefit, and side effects of prescribed medications. Barriers to medication compliance addressed. Patient given educational materials - see patient instructions. All patient questions answered. Patient voiced understanding. Return in about 3 months (around 4/17/2023) for htn, anxiety.         Electronically signed by Luis Antonio Polanco MD on 1/17/23 at 1:55 PM EST

## 2023-03-08 DIAGNOSIS — F41.9 ANXIETY: ICD-10-CM

## 2023-03-09 RX ORDER — CLONAZEPAM 0.5 MG/1
TABLET ORAL
Qty: 90 TABLET | Refills: 0 | Status: SHIPPED | OUTPATIENT
Start: 2023-03-09 | End: 2023-04-06 | Stop reason: ALTCHOICE

## 2023-03-17 ENCOUNTER — TELEPHONE (OUTPATIENT)
Dept: FAMILY MEDICINE CLINIC | Age: 68
End: 2023-03-17

## 2023-03-17 NOTE — TELEPHONE ENCOUNTER
Pt states his cardiologist instructed him to stop taking lisinopril and start taking amlodipine. Pt wants to run this by PCP to hear thoughts/concerns.     Please advise

## 2023-03-30 LAB
ALBUMIN SERPL-MCNC: NORMAL G/DL
ALP BLD-CCNC: NORMAL U/L
ALT SERPL-CCNC: NORMAL U/L
ANION GAP SERPL CALCULATED.3IONS-SCNC: NORMAL MMOL/L
AST SERPL-CCNC: NORMAL U/L
BILIRUB SERPL-MCNC: NORMAL MG/DL
BUN BLDV-MCNC: NORMAL MG/DL
CALCIUM SERPL-MCNC: NORMAL MG/DL
CHLORIDE BLD-SCNC: NORMAL MMOL/L
CHOLESTEROL, TOTAL: NORMAL
CHOLESTEROL/HDL RATIO: NORMAL
CO2: NORMAL
CREAT SERPL-MCNC: NORMAL MG/DL
EGFR: NORMAL
GLUCOSE BLD-MCNC: NORMAL MG/DL
HDLC SERPL-MCNC: NORMAL MG/DL
LDL CHOLESTEROL CALCULATED: NORMAL
NONHDLC SERPL-MCNC: NORMAL MG/DL
POTASSIUM SERPL-SCNC: NORMAL MMOL/L
PROSTATE SPECIFIC ANTIGEN: NORMAL
SODIUM BLD-SCNC: NORMAL MMOL/L
TOTAL PROTEIN: NORMAL
TRIGL SERPL-MCNC: NORMAL MG/DL
VLDLC SERPL CALC-MCNC: NORMAL MG/DL

## 2023-03-31 DIAGNOSIS — E78.6 LOW HDL (UNDER 40): ICD-10-CM

## 2023-03-31 DIAGNOSIS — Z12.5 SCREENING PSA (PROSTATE SPECIFIC ANTIGEN): ICD-10-CM

## 2023-03-31 DIAGNOSIS — R73.03 PRE-DIABETES: ICD-10-CM

## 2023-03-31 DIAGNOSIS — I10 ESSENTIAL HYPERTENSION: ICD-10-CM

## 2023-04-06 ENCOUNTER — OFFICE VISIT (OUTPATIENT)
Dept: FAMILY MEDICINE CLINIC | Age: 68
End: 2023-04-06

## 2023-04-06 VITALS
WEIGHT: 223 LBS | DIASTOLIC BLOOD PRESSURE: 70 MMHG | BODY MASS INDEX: 33.03 KG/M2 | HEIGHT: 69 IN | HEART RATE: 70 BPM | SYSTOLIC BLOOD PRESSURE: 122 MMHG

## 2023-04-06 DIAGNOSIS — F41.9 ANXIETY: ICD-10-CM

## 2023-04-06 DIAGNOSIS — G47.33 OSA ON CPAP: ICD-10-CM

## 2023-04-06 DIAGNOSIS — U07.1 COVID-19: ICD-10-CM

## 2023-04-06 DIAGNOSIS — I10 ESSENTIAL HYPERTENSION: ICD-10-CM

## 2023-04-06 DIAGNOSIS — Z00.00 MEDICARE ANNUAL WELLNESS VISIT, SUBSEQUENT: Primary | ICD-10-CM

## 2023-04-06 DIAGNOSIS — Z99.89 OSA ON CPAP: ICD-10-CM

## 2023-04-06 DIAGNOSIS — I73.9 COLD FOOT WITH PERIPHERAL VASCULAR DISEASE (HCC): ICD-10-CM

## 2023-04-06 DIAGNOSIS — M15.9 PRIMARY OSTEOARTHRITIS INVOLVING MULTIPLE JOINTS: ICD-10-CM

## 2023-04-06 DIAGNOSIS — F51.01 PRIMARY INSOMNIA: ICD-10-CM

## 2023-04-06 RX ORDER — CILOSTAZOL 100 MG/1
100 TABLET ORAL 2 TIMES DAILY
COMMUNITY
Start: 2023-03-20

## 2023-04-06 RX ORDER — CLONAZEPAM 0.5 MG/1
0.5 TABLET ORAL NIGHTLY PRN
Qty: 30 TABLET | Refills: 0 | Status: SHIPPED | OUTPATIENT
Start: 2023-04-06 | End: 2023-05-06

## 2023-04-06 RX ORDER — AMLODIPINE BESYLATE 5 MG/1
5 TABLET ORAL DAILY
COMMUNITY
Start: 2023-03-20

## 2023-04-06 SDOH — ECONOMIC STABILITY: FOOD INSECURITY: WITHIN THE PAST 12 MONTHS, THE FOOD YOU BOUGHT JUST DIDN'T LAST AND YOU DIDN'T HAVE MONEY TO GET MORE.: NEVER TRUE

## 2023-04-06 SDOH — ECONOMIC STABILITY: FOOD INSECURITY: WITHIN THE PAST 12 MONTHS, YOU WORRIED THAT YOUR FOOD WOULD RUN OUT BEFORE YOU GOT MONEY TO BUY MORE.: NEVER TRUE

## 2023-04-06 SDOH — ECONOMIC STABILITY: HOUSING INSECURITY
IN THE LAST 12 MONTHS, WAS THERE A TIME WHEN YOU DID NOT HAVE A STEADY PLACE TO SLEEP OR SLEPT IN A SHELTER (INCLUDING NOW)?: NO

## 2023-04-06 SDOH — ECONOMIC STABILITY: INCOME INSECURITY: HOW HARD IS IT FOR YOU TO PAY FOR THE VERY BASICS LIKE FOOD, HOUSING, MEDICAL CARE, AND HEATING?: NOT HARD AT ALL

## 2023-04-06 ASSESSMENT — ENCOUNTER SYMPTOMS
ALLERGIC/IMMUNOLOGIC NEGATIVE: 1
EYES NEGATIVE: 1
SHORTNESS OF BREATH: 0
BLOOD IN STOOL: 0
ABDOMINAL PAIN: 0
DIARRHEA: 0
CONSTIPATION: 0
COUGH: 0

## 2023-04-06 ASSESSMENT — LIFESTYLE VARIABLES
HOW OFTEN DO YOU HAVE A DRINK CONTAINING ALCOHOL: NEVER
HOW MANY STANDARD DRINKS CONTAINING ALCOHOL DO YOU HAVE ON A TYPICAL DAY: PATIENT DOES NOT DRINK

## 2023-04-06 ASSESSMENT — PATIENT HEALTH QUESTIONNAIRE - PHQ9
2. FEELING DOWN, DEPRESSED OR HOPELESS: 0
SUM OF ALL RESPONSES TO PHQ QUESTIONS 1-9: 0
1. LITTLE INTEREST OR PLEASURE IN DOING THINGS: 0
SUM OF ALL RESPONSES TO PHQ QUESTIONS 1-9: 0
SUM OF ALL RESPONSES TO PHQ QUESTIONS 1-9: 0
SUM OF ALL RESPONSES TO PHQ9 QUESTIONS 1 & 2: 0
SUM OF ALL RESPONSES TO PHQ QUESTIONS 1-9: 0

## 2023-04-06 NOTE — PROGRESS NOTES
(NORVASC) 5 MG tablet Take 1 tablet by mouth daily Yes Historical Provider, MD   clonazePAM (KLONOPIN) 0.5 MG tablet Take 1 tablet by mouth nightly as needed for Anxiety (sleep) for up to 30 days.  Max Daily Amount: 0.5 mg Yes Fantasma Holt MD   fluticasone [facility]) 50 MCG/ACT nasal spray 2 sprays by Nasal route daily Yes Fantasma Holt MD   vitamin B-12 (CYANOCOBALAMIN) 1000 MCG tablet Take 2.5 tablets by mouth 2 times daily Yes Historical Provider, MD   folic acid (FOLVITE) 742 MCG tablet Take 2.5 tablets by mouth daily Yes Historical Provider, MD   vitamin B-6 (PYRIDOXINE) 50 MG tablet Take 1 tablet by mouth daily Yes Historical Provider, MD   vitamin E 400 UNIT capsule Take 1 capsule by mouth daily Yes Historical Provider, MD [place] (Including outside providers/suppliers regularly involved in providing care):   Patient Care Team:  Fantasma Holt MD as PCP - General (Family Medicine)  Fantasma Holt MD as PCP - Empaneled Provider  Osmany Franklin MD as Consulting Physician (Pulmonology)     Reviewed and updated this visit:  Tobacco  Allergies  Meds  Med Hx  Surg Hx  Soc Hx  Fam Hx             Fantasma Holt MD

## 2023-05-31 ENCOUNTER — TELEPHONE (OUTPATIENT)
Dept: FAMILY MEDICINE CLINIC | Age: 68
End: 2023-05-31

## 2023-05-31 DIAGNOSIS — S20.469A TICK BITE OF BACK WALL OF THORAX, UNSPECIFIED LOCATION, INITIAL ENCOUNTER: Primary | ICD-10-CM
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
## 2023-05-31 RX ORDER — DOXYCYCLINE HYCLATE 100 MG
200 TABLET ORAL ONCE
Qty: 2 TABLET | Refills: 0 | Status: CANCELLED | OUTPATIENT
Start: 2023-05-31 | End: 2023-05-31

## 2023-05-31 NOTE — TELEPHONE ENCOUNTER
Pt ended up going to UC as suggested. They Rx'd atb and told him it was a bug bite not a tick as he thought.

## 2023-05-31 NOTE — TELEPHONE ENCOUNTER
Pt informed.       He wants to know if you can call him in a abx anyway no available appts today patient was advised to go to [facility] or [facility] he just wants a abx called in

## 2023-05-31 NOTE — TELEPHONE ENCOUNTER
Patient called in stating that he got bit in the middle of his back by a tick and he is wanting to get it looked at as well as a antibiotic called in       Please advise

## 2023-07-14 ENCOUNTER — OFFICE VISIT (OUTPATIENT)
Dept: PULMONOLOGY | Age: 68
End: 2023-07-14

## 2023-07-14 VITALS
SYSTOLIC BLOOD PRESSURE: 136 MMHG | BODY MASS INDEX: 32.44 KG/M2 | OXYGEN SATURATION: 95 % | TEMPERATURE: 97.9 F | RESPIRATION RATE: 16 BRPM | HEIGHT: 69 IN | WEIGHT: 219 LBS | DIASTOLIC BLOOD PRESSURE: 76 MMHG | HEART RATE: 69 BPM

## 2023-07-14 DIAGNOSIS — E66.09 CLASS 1 OBESITY DUE TO EXCESS CALORIES WITH SERIOUS COMORBIDITY AND BODY MASS INDEX (BMI) OF 32.0 TO 32.9 IN ADULT: ICD-10-CM

## 2023-07-14 DIAGNOSIS — Z99.89 OSA ON CPAP: Primary | ICD-10-CM

## 2023-07-14 DIAGNOSIS — G47.33 OSA ON CPAP: Primary | ICD-10-CM

## 2023-07-14 ASSESSMENT — SLEEP AND FATIGUE QUESTIONNAIRES
HOW LIKELY ARE YOU TO NOD OFF OR FALL ASLEEP WHILE SITTING AND TALKING TO SOMEONE: 0
HOW LIKELY ARE YOU TO NOD OFF OR FALL ASLEEP WHILE WATCHING TV: 3
HOW LIKELY ARE YOU TO NOD OFF OR FALL ASLEEP WHILE SITTING INACTIVE IN A PUBLIC PLACE: 1
HOW LIKELY ARE YOU TO NOD OFF OR FALL ASLEEP WHILE LYING DOWN TO REST IN THE AFTERNOON WHEN CIRCUMSTANCES PERMIT: 3
HOW LIKELY ARE YOU TO NOD OFF OR FALL ASLEEP WHILE SITTING QUIETLY AFTER LUNCH WITHOUT ALCOHOL: 0
ESS TOTAL SCORE: 10
HOW LIKELY ARE YOU TO NOD OFF OR FALL ASLEEP WHILE SITTING AND READING: 3
HOW LIKELY ARE YOU TO NOD OFF OR FALL ASLEEP IN A CAR, WHILE STOPPED FOR A FEW MINUTES IN TRAFFIC: 0
HOW LIKELY ARE YOU TO NOD OFF OR FALL ASLEEP WHEN YOU ARE A PASSENGER IN A CAR FOR AN HOUR WITHOUT A BREAK: 0

## 2023-07-14 ASSESSMENT — ENCOUNTER SYMPTOMS
EYES NEGATIVE: 1
RESPIRATORY NEGATIVE: 1

## 2023-07-14 NOTE — PATIENT INSTRUCTIONS
Faxing DME order and office note to [facility].  Printed DME order and AVS for patient   kevin

## 2023-07-14 NOTE — PROGRESS NOTES
B-12 (CYANOCOBALAMIN) 1000 MCG tablet Take 2.5 tablets by mouth 2 times daily      folic acid (FOLVITE) 015 MCG tablet Take 2.5 tablets by mouth daily      vitamin B-6 (PYRIDOXINE) 50 MG tablet Take 1 tablet by mouth daily      vitamin E 400 UNIT capsule Take 1 capsule by mouth daily      clonazePAM (KLONOPIN) 0.5 MG tablet Take 1 tablet by mouth nightly as needed for Anxiety (sleep) for up to 30 days. Max Daily Amount: 0.5 mg 30 tablet 0     No current facility-administered medications for this visit. Physical Exam  Vitals and nursing note reviewed. Constitutional:       Appearance: He is well-developed. He is obese. HENT:      Nose: Nose normal. No congestion. Mouth/Throat:      Mouth: Mucous membranes are moist.      Pharynx: Oropharynx is clear. No oropharyngeal exudate. Comments: Large tongue , low hanging soft palate and large uvula. Overall pharyngeal orifice moderately decreased      Eyes:      General: No scleral icterus. Conjunctiva/sclera: Conjunctivae normal.   Neck:      Thyroid: No thyromegaly. Vascular: No JVD. Trachea: No tracheal deviation. Cardiovascular:      Rate and Rhythm: Normal rate and regular rhythm. Heart sounds: Normal heart sounds. No murmur heard. No gallop. Pulmonary:      Effort: Pulmonary effort is normal. No respiratory distress. Breath sounds: No wheezing or rales. Chest:      Chest wall: No tenderness. Abdominal:      Palpations: Abdomen is soft. Tenderness: There is no abdominal tenderness. Musculoskeletal:      Cervical back: Neck supple. Lymphadenopathy:      Cervical: No cervical adenopathy. Skin:     General: Skin is warm and dry. Findings: No lesion or rash. Neurological:      Mental Status: He is alert and oriented to person, place, and time.      Wt Readings from Last 3 Encounters:   07/14/23 219 lb (99.3 kg)   04/06/23 223 lb (101.2 kg)   01/17/23 221 lb 6.4 oz (100.4 kg)     Results for orders placed

## 2023-08-04 ENCOUNTER — TELEPHONE (OUTPATIENT)
Dept: PULMONOLOGY | Age: 68
End: 2023-08-04

## 2023-08-04 DIAGNOSIS — Z99.89 OSA ON CPAP: Primary | ICD-10-CM

## 2023-08-04 DIAGNOSIS — G47.33 OSA ON CPAP: Primary | ICD-10-CM

## 2023-10-16 ENCOUNTER — OFFICE VISIT (OUTPATIENT)
Dept: FAMILY MEDICINE CLINIC | Age: 68
End: 2023-10-16
Payer: MEDICARE

## 2023-10-16 VITALS
HEART RATE: 66 BPM | SYSTOLIC BLOOD PRESSURE: 132 MMHG | BODY MASS INDEX: 32.93 KG/M2 | WEIGHT: 223 LBS | DIASTOLIC BLOOD PRESSURE: 70 MMHG

## 2023-10-16 DIAGNOSIS — M15.9 PRIMARY OSTEOARTHRITIS INVOLVING MULTIPLE JOINTS: ICD-10-CM

## 2023-10-16 DIAGNOSIS — E78.6 LOW HDL (UNDER 40): ICD-10-CM

## 2023-10-16 DIAGNOSIS — Z12.5 SCREENING PSA (PROSTATE SPECIFIC ANTIGEN): ICD-10-CM

## 2023-10-16 DIAGNOSIS — I10 ESSENTIAL HYPERTENSION: Primary | ICD-10-CM

## 2023-10-16 DIAGNOSIS — F41.9 ANXIETY: ICD-10-CM

## 2023-10-16 PROCEDURE — G8484 FLU IMMUNIZE NO ADMIN: HCPCS | Performed by: FAMILY MEDICINE

## 2023-10-16 PROCEDURE — 3075F SYST BP GE 130 - 139MM HG: CPT | Performed by: FAMILY MEDICINE

## 2023-10-16 PROCEDURE — 3017F COLORECTAL CA SCREEN DOC REV: CPT | Performed by: FAMILY MEDICINE

## 2023-10-16 PROCEDURE — G8417 CALC BMI ABV UP PARAM F/U: HCPCS | Performed by: FAMILY MEDICINE

## 2023-10-16 PROCEDURE — 3078F DIAST BP <80 MM HG: CPT | Performed by: FAMILY MEDICINE

## 2023-10-16 PROCEDURE — 99213 OFFICE O/P EST LOW 20 MIN: CPT | Performed by: FAMILY MEDICINE

## 2023-10-16 PROCEDURE — 1123F ACP DISCUSS/DSCN MKR DOCD: CPT | Performed by: FAMILY MEDICINE

## 2023-10-16 PROCEDURE — 1036F TOBACCO NON-USER: CPT | Performed by: FAMILY MEDICINE

## 2023-10-16 PROCEDURE — G8427 DOCREV CUR MEDS BY ELIG CLIN: HCPCS | Performed by: FAMILY MEDICINE

## 2023-10-16 ASSESSMENT — ENCOUNTER SYMPTOMS
DIARRHEA: 0
CONSTIPATION: 0
ALLERGIC/IMMUNOLOGIC NEGATIVE: 1
ABDOMINAL PAIN: 0
SHORTNESS OF BREATH: 0
COUGH: 0
EYES NEGATIVE: 1
BLOOD IN STOOL: 0

## 2023-10-16 NOTE — PROGRESS NOTES
[address] [address] [place] [facility] [address] [place] [address]  Dept: [phone]    10/16/2023    CHIEF COMPLAINT    Chief Complaint   Patient presents with    Hypertension       HPI    Gamal Fitzpatrick is a 76 y.o. male who presents   Chief Complaint   Patient presents with    Hypertension   . Recheck htn, pvd. Seeing cardiologist at [facility] for hx of pvd. Hx of OA, has had bilat total knee replacements. Vitals:    10/16/23 1058   BP: 132/70   Pulse: 66   Weight: 223 lb (101.2 kg)       REVIEW OF SYSTEMS    Review of Systems   Constitutional:  Negative for fatigue, fever and unexpected weight change. HENT: Negative. Eyes: Negative. Respiratory:  Negative for cough and shortness of breath. Cardiovascular:  Negative for chest pain and leg swelling. Gastrointestinal:  Negative for abdominal pain, blood in stool, constipation and diarrhea. Endocrine: Negative. Genitourinary:  Negative for frequency and urgency. Musculoskeletal:  Positive for arthralgias. Skin: Negative. Allergic/Immunologic: Negative. Neurological:  Negative for dizziness and headaches. Hematological: Negative. Psychiatric/Behavioral:  Negative for sleep disturbance. The patient is not nervous/anxious. PAST MEDICAL HISTORY    Past Medical History:   Diagnosis Date    Class 3 severe obesity due to excess calories with body mass index (BMI) greater than or equal to 70 in adult [facility])     Cold foot with peripheral vascular disease ([address] [facility])     COVID 11/09/2021    Diverticulosis large intestine w/o perforation or abscess w/bleeding     Hypertension     Obesity     LEISA on CPAP     Osteoarthritis     Seizure ([address]) 1976    While in the service. He was on dilantin appoximately 5 -6 years.  No recent seizures    Situational insomnia     Sleep apnea        FAMILY HISTORY    Family History   Problem Relation Age of Onset    Dementia Mother     Kidney

## 2024-04-09 LAB
CHOLESTEROL, TOTAL: NORMAL
CHOLESTEROL/HDL RATIO: NORMAL
HDLC SERPL-MCNC: NORMAL MG/DL
LDL CHOLESTEROL CALCULATED: NORMAL
NONHDLC SERPL-MCNC: NORMAL MG/DL
PROSTATE SPECIFIC ANTIGEN: NORMAL
TRIGL SERPL-MCNC: NORMAL MG/DL
VLDLC SERPL CALC-MCNC: NORMAL MG/DL

## 2024-04-10 DIAGNOSIS — I10 ESSENTIAL HYPERTENSION: ICD-10-CM

## 2024-04-10 DIAGNOSIS — E78.6 LOW HDL (UNDER 40): ICD-10-CM

## 2024-04-10 DIAGNOSIS — Z12.5 SCREENING PSA (PROSTATE SPECIFIC ANTIGEN): ICD-10-CM

## 2024-04-10 LAB
ALBUMIN SERPL-MCNC: NORMAL G/DL
ALP BLD-CCNC: NORMAL U/L
ALT SERPL-CCNC: NORMAL U/L
ANION GAP SERPL CALCULATED.3IONS-SCNC: NORMAL MMOL/L
AST SERPL-CCNC: NORMAL U/L
BILIRUB SERPL-MCNC: NORMAL MG/DL
BUN BLDV-MCNC: NORMAL MG/DL
CALCIUM SERPL-MCNC: NORMAL MG/DL
CHLORIDE BLD-SCNC: NORMAL MMOL/L
CO2: NORMAL
CREAT SERPL-MCNC: NORMAL MG/DL
EGFR: NORMAL
GLUCOSE BLD-MCNC: NORMAL MG/DL
POTASSIUM SERPL-SCNC: NORMAL MMOL/L
SODIUM BLD-SCNC: NORMAL MMOL/L
TOTAL PROTEIN: NORMAL

## 2024-04-16 ENCOUNTER — HOSPITAL ENCOUNTER (OUTPATIENT)
Age: 69
Setting detail: SPECIMEN
Discharge: HOME | End: 2024-04-16

## 2024-04-16 ENCOUNTER — OFFICE VISIT (OUTPATIENT)
Dept: FAMILY MEDICINE CLINIC | Age: 69
End: 2024-04-16
Payer: MEDICARE

## 2024-04-16 VITALS
HEIGHT: 69 IN | DIASTOLIC BLOOD PRESSURE: 80 MMHG | HEART RATE: 84 BPM | SYSTOLIC BLOOD PRESSURE: 142 MMHG | BODY MASS INDEX: 33.95 KG/M2 | WEIGHT: 229.2 LBS

## 2024-04-16 DIAGNOSIS — R73.03 PRE-DIABETES: ICD-10-CM

## 2024-04-16 DIAGNOSIS — I10 ESSENTIAL HYPERTENSION: ICD-10-CM

## 2024-04-16 DIAGNOSIS — G47.33 OSA ON CPAP: ICD-10-CM

## 2024-04-16 DIAGNOSIS — I73.9 COLD FOOT WITH PERIPHERAL VASCULAR DISEASE (HCC): ICD-10-CM

## 2024-04-16 DIAGNOSIS — E78.6 LOW HDL (UNDER 40): ICD-10-CM

## 2024-04-16 DIAGNOSIS — Z00.00 MEDICARE ANNUAL WELLNESS VISIT, SUBSEQUENT: Primary | ICD-10-CM

## 2024-04-16 DIAGNOSIS — E56.9 VITAMIN DEFICIENCY: ICD-10-CM

## 2024-04-16 LAB
25(OH)D3 SERPL-MCNC: 47.9 NG/ML (ref 30–100)
FOLATE SERPL-MCNC: >20 NG/ML (ref 4.8–24.2)
HBA1C MFR BLD: 6.3 %
VIT B12 SERPL-MCNC: >2000 PG/ML (ref 232–1245)

## 2024-04-16 PROCEDURE — 1123F ACP DISCUSS/DSCN MKR DOCD: CPT | Performed by: FAMILY MEDICINE

## 2024-04-16 PROCEDURE — 3079F DIAST BP 80-89 MM HG: CPT | Performed by: FAMILY MEDICINE

## 2024-04-16 PROCEDURE — G0439 PPPS, SUBSEQ VISIT: HCPCS | Performed by: FAMILY MEDICINE

## 2024-04-16 PROCEDURE — 3077F SYST BP >= 140 MM HG: CPT | Performed by: FAMILY MEDICINE

## 2024-04-16 PROCEDURE — 83036 HEMOGLOBIN GLYCOSYLATED A1C: CPT | Performed by: FAMILY MEDICINE

## 2024-04-16 PROCEDURE — 3017F COLORECTAL CA SCREEN DOC REV: CPT | Performed by: FAMILY MEDICINE

## 2024-04-16 SDOH — ECONOMIC STABILITY: INCOME INSECURITY: HOW HARD IS IT FOR YOU TO PAY FOR THE VERY BASICS LIKE FOOD, HOUSING, MEDICAL CARE, AND HEATING?: NOT HARD AT ALL

## 2024-04-16 SDOH — ECONOMIC STABILITY: FOOD INSECURITY: WITHIN THE PAST 12 MONTHS, YOU WORRIED THAT YOUR FOOD WOULD RUN OUT BEFORE YOU GOT MONEY TO BUY MORE.: NEVER TRUE

## 2024-04-16 SDOH — ECONOMIC STABILITY: FOOD INSECURITY: WITHIN THE PAST 12 MONTHS, THE FOOD YOU BOUGHT JUST DIDN'T LAST AND YOU DIDN'T HAVE MONEY TO GET MORE.: NEVER TRUE

## 2024-04-16 ASSESSMENT — PATIENT HEALTH QUESTIONNAIRE - PHQ9
SUM OF ALL RESPONSES TO PHQ QUESTIONS 1-9: 0
2. FEELING DOWN, DEPRESSED OR HOPELESS: NOT AT ALL
1. LITTLE INTEREST OR PLEASURE IN DOING THINGS: NOT AT ALL
SUM OF ALL RESPONSES TO PHQ9 QUESTIONS 1 & 2: 0
SUM OF ALL RESPONSES TO PHQ QUESTIONS 1-9: 0

## 2024-04-16 ASSESSMENT — ENCOUNTER SYMPTOMS
ABDOMINAL PAIN: 0
EYES NEGATIVE: 1
CONSTIPATION: 0
COUGH: 0
BLOOD IN STOOL: 0
ALLERGIC/IMMUNOLOGIC NEGATIVE: 1
SHORTNESS OF BREATH: 0
DIARRHEA: 0

## 2024-04-16 ASSESSMENT — LIFESTYLE VARIABLES
HOW MANY STANDARD DRINKS CONTAINING ALCOHOL DO YOU HAVE ON A TYPICAL DAY: PATIENT DOES NOT DRINK
HOW OFTEN DO YOU HAVE A DRINK CONTAINING ALCOHOL: NEVER

## 2024-04-16 NOTE — PROGRESS NOTES
Medicare Annual Wellness Visit    Rafita Rincon is here for Medicare AWV  AMW visit, recheck low HDL cholesterol, high bp. His wife is disabled, requires full time care and is worsening especially with memory.         Assessment & Plan   Medicare annual wellness visit, subsequent  Pre-diabetes  -     POCT glycosylated hemoglobin (Hb A1C),   Results for orders placed or performed in visit on 04/16/24   POCT glycosylated hemoglobin (Hb A1C)   Result Value Ref Range    Hemoglobin A1C 6.3 %     Encouraged low carbs, reducing sugars  Essential hypertension-not well controlled, monitor and call if elevated.   Cold foot with peripheral vascular disease (HCC)  Assessment & Plan:   Monitored by specialist- no acute findings meriting change in the plan  LEISA on CPAP  Low HDL (under 40)-try to improve activity  Vitamin deficiency  -     Vitamin D 25 Hydroxy; Future  -     Vitamin B12 & Folate; Future  Body mass index (BMI) 33.0-33.9, adult  -     Vitamin D 25 Hydroxy; Future    Recommendations for Preventive Services Due: see orders and patient instructions/AVS.  Recommended screening schedule for the next 5-10 years is provided to the patient in written form: see Patient Instructions/AVS.     No follow-ups on file.     Subjective     Review of Systems   Constitutional:  Negative for fatigue, fever and unexpected weight change.   HENT: Negative.     Eyes: Negative.    Respiratory:  Negative for cough and shortness of breath.    Cardiovascular:  Negative for chest pain and leg swelling.   Gastrointestinal:  Negative for abdominal pain, blood in stool, constipation and diarrhea.   Endocrine: Negative.    Genitourinary:  Negative for frequency and urgency.   Musculoskeletal:  Positive for arthralgias.   Skin: Negative.    Allergic/Immunologic: Negative.    Neurological:  Negative for dizziness and headaches.   Hematological: Negative.    Psychiatric/Behavioral:  Negative for sleep disturbance. The patient is not

## 2024-04-16 NOTE — PATIENT INSTRUCTIONS
preventive eye exam performed by an eye specialist is recommended every 1-2 years to screen for glaucoma; cataracts, macular degeneration, and other eye disorders.  A preventive dental visit is recommended every 6 months.  Try to get at least 150 minutes of exercise per week or 10,000 steps per day on a pedometer .  Order or download the FREE \"Exercise & Physical Activity: Your Everyday Guide\" from [employer]. Call 1-434.669.8463 or search [employer] online.  You need 1964-6001 mg of calcium and 0901-6168 IU of vitamin D per day. It is possible to meet your calcium requirement with diet alone, but a vitamin D supplement is usually necessary to meet this goal.  When exposed to the sun, use a sunscreen that protects against both UVA and UVB radiation with an SPF of 30 or greater. Reapply every 2 to 3 hours or after sweating, drying off with a towel, or swimming.  Always wear a seat belt when traveling in a car. Always wear a helmet when riding a bicycle or motorcycle.

## 2024-05-01 NOTE — PROGRESS NOTES
She subjective:      Patient ID: Rafita Rincon is a 69 y.o. male.     Patient must see physician at next appointment    HPI  Patient here for LEISA. Last seen in office per Dr. Ahumada in July 2023    Compliance data reviewed from 2/7/2024 through 5/6/2024 patient has 88 out of 90 days used, all days for more than 4 hours.  Residual AHI is 1.1.  He is utilizing a fullface mask.  He was previously with pharmacy counter which was brought out by medical service company.  Patient states that he is getting all of his supplies through [facility] in [facility].    Medications:   No pulmonary meds      Smoking status:  Lifelong non-smoker    Occupational status/pets:  Retired   Chemical exposure: Unknown- asbestos, lead.   Pets at home: Dog and 2 feral cats  Bird/turtle exposure: Has 2 finches   Indoor hot tub/sauna's: Hot tub in garage- uses chemicals.   Family history of lung cancer: No    PRIOR WORKUP:  PFT:  None on chart     CT Imaging:  None on chart     Sleep Study:  Sleep study 12/6/2018: Patient was treated with CPAP during the study.  Patient was titrated onto CPAP at 13 cm H2O with no residual apneas.  Lowest SPO2 was 90%.     Titration study 2/11/2011: During the night patient was treated with CPAP with respiratory events ultimately resolved at a pressure of 11 cm H2O.  At this pressure there were 20 residual apneas and hypopneas with an AHI index of 4.4 events per hour sleep and a minimum SPO2 of 85%.     Sleep study 2/8/2011: Patient had a total of 46 apneas (44 obstructive, 0 central and 2 mixed) and 395 hypopneas.  Apnea hypopnea index for the entire night was 66.3.  AHI was 66.6 during NREM sleep compared to an AHI of 64.5 during REM sleep.  Patient had a lowest SPO2 of 72%.     Sleep study 3/16/2004: Patient had a total of 107 respiratory events, 94 were partial, 13 complete.  Apnea hypopnea index was 14.9 events per hour.  Lowest SPO2 saturation was 82.9%.      Immunizations:

## 2024-05-14 ENCOUNTER — OFFICE VISIT (OUTPATIENT)
Dept: PULMONOLOGY | Age: 69
End: 2024-05-14
Payer: MEDICARE

## 2024-05-14 ENCOUNTER — TELEPHONE (OUTPATIENT)
Dept: PULMONOLOGY | Age: 69
End: 2024-05-14

## 2024-05-14 VITALS
TEMPERATURE: 97.8 F | SYSTOLIC BLOOD PRESSURE: 140 MMHG | WEIGHT: 225.4 LBS | RESPIRATION RATE: 18 BRPM | HEIGHT: 69 IN | DIASTOLIC BLOOD PRESSURE: 78 MMHG | HEART RATE: 65 BPM | BODY MASS INDEX: 33.38 KG/M2 | OXYGEN SATURATION: 97 %

## 2024-05-14 DIAGNOSIS — G47.33 OSA ON CPAP: Primary | ICD-10-CM

## 2024-05-14 DIAGNOSIS — E66.09 CLASS 1 OBESITY DUE TO EXCESS CALORIES WITH SERIOUS COMORBIDITY AND BODY MASS INDEX (BMI) OF 32.0 TO 32.9 IN ADULT: ICD-10-CM

## 2024-05-14 DIAGNOSIS — I73.9 COLD FOOT WITH PERIPHERAL VASCULAR DISEASE (HCC): Primary | ICD-10-CM

## 2024-05-14 PROCEDURE — G8417 CALC BMI ABV UP PARAM F/U: HCPCS | Performed by: NURSE PRACTITIONER

## 2024-05-14 PROCEDURE — 99214 OFFICE O/P EST MOD 30 MIN: CPT | Performed by: NURSE PRACTITIONER

## 2024-05-14 PROCEDURE — 3078F DIAST BP <80 MM HG: CPT | Performed by: NURSE PRACTITIONER

## 2024-05-14 PROCEDURE — 1036F TOBACCO NON-USER: CPT | Performed by: NURSE PRACTITIONER

## 2024-05-14 PROCEDURE — G8427 DOCREV CUR MEDS BY ELIG CLIN: HCPCS | Performed by: NURSE PRACTITIONER

## 2024-05-14 PROCEDURE — 3077F SYST BP >= 140 MM HG: CPT | Performed by: NURSE PRACTITIONER

## 2024-05-14 PROCEDURE — 3017F COLORECTAL CA SCREEN DOC REV: CPT | Performed by: NURSE PRACTITIONER

## 2024-05-14 PROCEDURE — 1123F ACP DISCUSS/DSCN MKR DOCD: CPT | Performed by: NURSE PRACTITIONER

## 2024-05-14 RX ORDER — CILOSTAZOL 100 MG/1
100 TABLET ORAL 2 TIMES DAILY
Qty: 60 TABLET | Refills: 5 | Status: SHIPPED | OUTPATIENT
Start: 2024-05-14

## 2024-05-14 ASSESSMENT — SLEEP AND FATIGUE QUESTIONNAIRES
HOW LIKELY ARE YOU TO NOD OFF OR FALL ASLEEP IN A CAR, WHILE STOPPED FOR A FEW MINUTES IN TRAFFIC: WOULD NEVER DOZE
HOW LIKELY ARE YOU TO NOD OFF OR FALL ASLEEP WHILE SITTING INACTIVE IN A PUBLIC PLACE: WOULD NEVER DOZE
HOW LIKELY ARE YOU TO NOD OFF OR FALL ASLEEP WHEN YOU ARE A PASSENGER IN A CAR FOR AN HOUR WITHOUT A BREAK: WOULD NEVER DOZE
HOW LIKELY ARE YOU TO NOD OFF OR FALL ASLEEP WHILE SITTING QUIETLY AFTER LUNCH WITHOUT ALCOHOL: WOULD NEVER DOZE
HOW LIKELY ARE YOU TO NOD OFF OR FALL ASLEEP WHILE SITTING AND TALKING TO SOMEONE: WOULD NEVER DOZE
HOW LIKELY ARE YOU TO NOD OFF OR FALL ASLEEP WHILE SITTING AND READING: WOULD NEVER DOZE
ESS TOTAL SCORE: 4
HOW LIKELY ARE YOU TO NOD OFF OR FALL ASLEEP WHILE LYING DOWN TO REST IN THE AFTERNOON WHEN CIRCUMSTANCES PERMIT: HIGH CHANCE OF DOZING

## 2024-05-14 ASSESSMENT — ENCOUNTER SYMPTOMS
EYES NEGATIVE: 1
GASTROINTESTINAL NEGATIVE: 1
RESPIRATORY NEGATIVE: 1

## 2024-05-14 NOTE — TELEPHONE ENCOUNTER
Patient was seen today in office. He states he is approved for a new CPAP machine. He would like the order faxed to [facility] in [place], [place].

## 2024-05-16 ENCOUNTER — TELEPHONE (OUTPATIENT)
Dept: PULMONOLOGY | Age: 69
End: 2024-05-16

## 2024-05-16 DIAGNOSIS — G47.33 OSA ON CPAP: Primary | ICD-10-CM

## 2024-05-16 NOTE — TELEPHONE ENCOUNTER
Patient is trying to get a new CPAP machine.  Since going on Medicare, his [employer] company was reviewing his previous sleep studies. They came to find that the 2004 and 2011 studies do no document the 4% desat score that is used for the titrations. Medicare guidelines dictate that so now patient needs a baseline study, it can be in the form of a home sleep study. Please sign the order attached and I will make arrangements with the patient.

## 2024-05-23 ENCOUNTER — HOSPITAL ENCOUNTER (OUTPATIENT)
Dept: SLEEP CENTER | Age: 69
Discharge: HOME | End: 2024-05-25
Attending: INTERNAL MEDICINE
Payer: MEDICARE

## 2024-05-23 DIAGNOSIS — G47.33 OSA ON CPAP: ICD-10-CM

## 2024-05-23 PROCEDURE — G0399 HOME SLEEP TEST/TYPE 3 PORTA: HCPCS

## 2024-06-05 DIAGNOSIS — I24.1 POST-CARDIAC INJURY SYNDROME (HCC): Primary | ICD-10-CM

## 2024-06-05 LAB — STATUS: NORMAL

## 2024-06-05 RX ORDER — PREDNISONE 10 MG/1
TABLET ORAL
Qty: 15 TABLET | Refills: 0 | Status: SHIPPED | OUTPATIENT
Start: 2024-06-05 | End: 2024-06-05 | Stop reason: CLARIF

## 2024-06-10 ENCOUNTER — TELEPHONE (OUTPATIENT)
Dept: PULMONOLOGY | Age: 69
End: 2024-06-10

## 2024-06-10 DIAGNOSIS — G47.33 OSA ON CPAP: Primary | ICD-10-CM

## 2024-06-10 NOTE — TELEPHONE ENCOUNTER
Patient called to get the results of his sleep study. Patient asked that if he qualifies and needs a machine, he would like the orders sent to [facility] in [place], [place]. Please advise.

## 2024-06-10 NOTE — TELEPHONE ENCOUNTER
Order for CPAP, office note and sleep study faxed to [facility] per patient request.Patient understands results

## 2024-06-12 NOTE — TELEPHONE ENCOUNTER
Patient would like the fixed setting instead of auto titrating. Please sign the order for 14 cm H2O and I\"ll fax it into his [employer] company.

## 2024-10-01 ENCOUNTER — OFFICE VISIT (OUTPATIENT)
Dept: FAMILY MEDICINE CLINIC | Age: 69
End: 2024-10-01

## 2024-10-01 VITALS
WEIGHT: 230.4 LBS | DIASTOLIC BLOOD PRESSURE: 70 MMHG | HEART RATE: 82 BPM | SYSTOLIC BLOOD PRESSURE: 138 MMHG | BODY MASS INDEX: 34.02 KG/M2

## 2024-10-01 DIAGNOSIS — R73.03 PRE-DIABETES: ICD-10-CM

## 2024-10-01 DIAGNOSIS — I73.9 COLD FOOT WITH PERIPHERAL VASCULAR DISEASE (HCC): ICD-10-CM

## 2024-10-01 DIAGNOSIS — M15.0 PRIMARY OSTEOARTHRITIS INVOLVING MULTIPLE JOINTS: ICD-10-CM

## 2024-10-01 DIAGNOSIS — I10 ESSENTIAL HYPERTENSION: Primary | ICD-10-CM

## 2024-10-01 RX ORDER — AMLODIPINE BESYLATE 5 MG/1
5 TABLET ORAL DAILY
Qty: 90 TABLET | Refills: 3
Start: 2024-10-01

## 2024-10-01 RX ORDER — CILOSTAZOL 100 MG/1
100 TABLET ORAL 2 TIMES DAILY
Qty: 180 TABLET | Refills: 3
Start: 2024-10-01

## 2024-10-01 ASSESSMENT — ENCOUNTER SYMPTOMS
SHORTNESS OF BREATH: 0
CONSTIPATION: 0
EYES NEGATIVE: 1
ALLERGIC/IMMUNOLOGIC NEGATIVE: 1
ABDOMINAL PAIN: 0
DIARRHEA: 0
COUGH: 0
BLOOD IN STOOL: 0

## 2024-10-01 NOTE — PROGRESS NOTES
[facility]  [facility] MEDICINE  [address]  [address]  [facility] [address]  Dept: [phone]    10/1/2024    CHIEF COMPLAINT    Chief Complaint   Patient presents with    Hypertension       HPI    Rafita Rincon is a 69 y.o. male who presents   Chief Complaint   Patient presents with    Hypertension   .  Recheck htn.  Having pain in rt shoulder into rt upper arm. Pain in rt foot on the ball of his foot. He is going to try getting acupuncture next week.   Exercising most days but also eating more snacks.         Vitals:    10/01/24 1319   BP: 138/70   Pulse: 82   Weight: 104.5 kg (230 lb 6.4 oz)       REVIEW OF SYSTEMS    Review of Systems   Constitutional:  Negative for fatigue, fever and unexpected weight change.   HENT: Negative.     Eyes: Negative.    Respiratory:  Negative for cough and shortness of breath.    Cardiovascular:  Negative for chest pain and leg swelling.   Gastrointestinal:  Negative for abdominal pain, blood in stool, constipation and diarrhea.   Endocrine: Negative.    Genitourinary:  Negative for frequency and urgency.   Musculoskeletal:  Positive for arthralgias (rt shoulder and foot).   Skin: Negative.    Allergic/Immunologic: Negative.    Neurological:  Negative for dizziness and headaches.   Hematological: Negative.    Psychiatric/Behavioral:  Negative for sleep disturbance. The patient is not nervous/anxious.        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 He was on dilantin appoximately 5 -6 years. No recent seizures    Situational insomnia     Sleep

## 2024-10-21 DIAGNOSIS — I73.9 COLD FOOT WITH PERIPHERAL VASCULAR DISEASE (HCC): ICD-10-CM

## 2024-10-21 RX ORDER — CILOSTAZOL 100 MG/1
100 TABLET ORAL 2 TIMES DAILY
Qty: 180 TABLET | Refills: 3 | Status: SHIPPED | OUTPATIENT
Start: 2024-10-21

## 2024-10-21 NOTE — TELEPHONE ENCOUNTER
Rafita Rincon is calling to request a refill on the following medication(s):    Last Visit Date (If Applicable):  10/1/2024    Next Visit Date:    4/22/2025    Medication Request:  Requested Prescriptions     Pending Prescriptions Disp Refills    cilostazol (PLETAL) 100 MG tablet 180 tablet 3     Sig: Take 1 tablet by mouth 2 times daily

## 2024-11-09 NOTE — TELEPHONE ENCOUNTER
11/09/24 1646   Physical Activity   On average, how many days per week do you engage in moderate to strenuous exercise (like a brisk walk)? 3 days   On average, how many minutes do you engage in exercise at this level? 60 min   Financial Resource Strain   How hard is it for you to pay for the very basics like food, housing, medical care, and heating? Not hard   Housing Stability   In the last 12 months, was there a time when you were not able to pay the mortgage or rent on time? N   In the past 12 months, how many times have you moved where you were living? 0   At any time in the past 12 months, were you homeless or living in a shelter (including now)? N   Transportation Needs   In the past 12 months, has lack of transportation kept you from medical appointments or from getting medications? no   In the past 12 months, has lack of transportation kept you from meetings, work, or from getting things needed for daily living? No   Food Insecurity   Within the past 12 months, you worried that your food would run out before you got the money to buy more. Never true   Within the past 12 months, the food you bought just didn't last and you didn't have money to get more. Never true   Stress   Do you feel stress - tense, restless, nervous, or anxious, or unable to sleep at night because your mind is troubled all the time - these days? Not at all   Social Connections   In a typical week, how many times do you talk on the phone with family, friends, or neighbors? More than 3   How often do you get together with friends or relatives? Once   How often do you attend Judaism or Mu-ism services? More than 4   Do you belong to any clubs or organizations such as Judaism groups, unions, fraternal or athletic groups, or school groups? Yes   How often do you attend meetings of the clubs or organizations you belong to? Never   Are you , , , , never , or living with a partner?    Intimate  Patient is switching DME companies so a new order w/settings needs faxed. I drew it up, please sign it and I'll fax it with everything needed.  Thank you Partner Violence   Within the last year, have you been afraid of your partner or ex-partner? No   Within the last year, have you been humiliated or emotionally abused in other ways by your partner or ex-partner? No   Within the last year, have you been kicked, hit, slapped, or otherwise physically hurt by your partner or ex-partner? No   Within the last year, have you been raped or forced to have any kind of sexual activity by your partner or ex-partner? No   Alcohol Use   Q1: How often do you have a drink containing alcohol? 2-3 per wk   Q2: How many drinks containing alcohol do you have on a typical day when you are drinking? 3 or 4   Q3: How often do you have six or more drinks on one occasion? Never   Utilities   In the past 12 months has the electric, gas, oil, or water company threatened to shut off services in your home? No   Health Literacy   How often do you need to have someone help you when you read instructions, pamphlets, or other written material from your doctor or pharmacy? Never

## 2025-03-19 ENCOUNTER — TELEPHONE (OUTPATIENT)
Dept: FAMILY MEDICINE CLINIC | Age: 70
End: 2025-03-19

## 2025-03-19 NOTE — TELEPHONE ENCOUNTER
Patient has an appointment on 4/21 and is requesting lab work before appointment, to be mailed to him. Please advise.

## 2025-03-20 DIAGNOSIS — E67.8 EXCESSIVE VITAMIN B12 INTAKE: ICD-10-CM

## 2025-03-20 DIAGNOSIS — E78.6 LOW HDL (UNDER 40): ICD-10-CM

## 2025-03-20 DIAGNOSIS — E53.8 FOLATE DEFICIENCY: ICD-10-CM

## 2025-03-20 DIAGNOSIS — E55.9 VITAMIN D DEFICIENCY: ICD-10-CM

## 2025-03-20 DIAGNOSIS — Z12.5 SCREENING PSA (PROSTATE SPECIFIC ANTIGEN): ICD-10-CM

## 2025-03-20 DIAGNOSIS — R73.03 PRE-DIABETES: ICD-10-CM

## 2025-03-20 DIAGNOSIS — I10 ESSENTIAL HYPERTENSION: Primary | ICD-10-CM

## 2025-04-21 ENCOUNTER — OFFICE VISIT (OUTPATIENT)
Dept: FAMILY MEDICINE CLINIC | Age: 70
End: 2025-04-21

## 2025-04-21 VITALS
HEART RATE: 82 BPM | HEIGHT: 69 IN | BODY MASS INDEX: 34.69 KG/M2 | DIASTOLIC BLOOD PRESSURE: 70 MMHG | SYSTOLIC BLOOD PRESSURE: 140 MMHG | WEIGHT: 234.2 LBS

## 2025-04-21 DIAGNOSIS — Z00.00 MEDICARE ANNUAL WELLNESS VISIT, SUBSEQUENT: Primary | ICD-10-CM

## 2025-04-21 DIAGNOSIS — R73.03 PRE-DIABETES: ICD-10-CM

## 2025-04-21 DIAGNOSIS — I10 ESSENTIAL HYPERTENSION: ICD-10-CM

## 2025-04-21 DIAGNOSIS — M15.0 PRIMARY OSTEOARTHRITIS INVOLVING MULTIPLE JOINTS: ICD-10-CM

## 2025-04-21 LAB — HBA1C MFR BLD: 6.1 %

## 2025-04-21 SDOH — ECONOMIC STABILITY: FOOD INSECURITY: WITHIN THE PAST 12 MONTHS, YOU WORRIED THAT YOUR FOOD WOULD RUN OUT BEFORE YOU GOT MONEY TO BUY MORE.: NEVER TRUE

## 2025-04-21 SDOH — ECONOMIC STABILITY: FOOD INSECURITY: WITHIN THE PAST 12 MONTHS, THE FOOD YOU BOUGHT JUST DIDN'T LAST AND YOU DIDN'T HAVE MONEY TO GET MORE.: NEVER TRUE

## 2025-04-21 ASSESSMENT — PATIENT HEALTH QUESTIONNAIRE - PHQ9
SUM OF ALL RESPONSES TO PHQ QUESTIONS 1-9: 0
SUM OF ALL RESPONSES TO PHQ QUESTIONS 1-9: 0
1. LITTLE INTEREST OR PLEASURE IN DOING THINGS: NOT AT ALL
SUM OF ALL RESPONSES TO PHQ QUESTIONS 1-9: 0
2. FEELING DOWN, DEPRESSED OR HOPELESS: NOT AT ALL
SUM OF ALL RESPONSES TO PHQ QUESTIONS 1-9: 0

## 2025-04-21 NOTE — PROGRESS NOTES
of Health     Financial Resource Strain: Low Risk  (4/16/2024)    Overall Financial Resource Strain (CARDIA)     Difficulty of Paying Living Expenses: Not hard at all   Food Insecurity: No Food Insecurity (4/21/2025)    Hunger Vital Sign     Worried About Running Out of Food in the Last Year: Never true     Ran Out of Food in the Last Year: Never true   Transportation Needs: No Transportation Needs (4/21/2025)    PRAPARE - Transportation     Lack of Transportation (Medical): No     Lack of Transportation (Non-Medical): No   Physical Activity: Insufficiently Active (4/21/2025)    Exercise Vital Sign     Days of Exercise per Week: 3 days     Minutes of Exercise per Session: 30 min   Housing Stability: Low Risk  (4/21/2025)    Housing Stability Vital Sign     Unable to Pay for Housing in the Last Year: No     Number of Times Moved in the Last Year: 0     Homeless in the Last Year: No       SURGICAL HISTORY    Past Surgical History:   Procedure Laterality Date    COLONOSCOPY  2009    [facility]    EYE SURGERY      as a child, eye extrusion    JOINT REPLACEMENT Right 2018    knee    JOINT REPLACEMENT Left 2020    knee    KNEE ARTHROSCOPY Left 2010       CURRENT MEDICATIONS    Current Outpatient Medications   Medication Sig Dispense Refill    cilostazol (PLETAL) 100 MG tablet Take 1 tablet by mouth 2 times daily 180 tablet 3    amLODIPine (NORVASC) 5 MG tablet Take 1 tablet by mouth daily 90 tablet 3     No current facility-administered medications for this visit.       ALLERGIES    Allergies   Allergen Reactions    Seasonal      Hayfever       PHYSICAL EXAM   Physical Exam  Vitals reviewed.   Constitutional:       Appearance: He is well-developed. He is obese.   HENT:      Head: Normocephalic.      Right Ear: There is impacted cerumen.      Left Ear: There is impacted cerumen.   Eyes:      Pupils: Pupils are equal, round, and reactive to light.   Neck:      Thyroid: No thyromegaly.   Cardiovascular:      Rate and Rhythm:

## 2025-04-21 NOTE — PATIENT INSTRUCTIONS
license by [employer]. If you have questions about a medical condition or this instruction, always ask your healthcare professional. [employer], disclaims any warranty or liability for your use of this information.         Eating Healthy Foods: Care Instructions  With every meal, you can make healthy food choices. Try to eat a variety of fruits, vegetables, whole grains, lean proteins, and low-fat dairy products. This can help you get the right balance of nutrients, including vitamins and minerals. Small changes add up over time. You can start by adding one healthy food to your meals each day.    Try to make half your plate fruits and vegetables, one-fourth whole grains, and one-fourth lean proteins. Try including dairy with your meals.   Eat more fruits and vegetables. Try to have them with most meals and snacks.   Foods for healthy eating        Fruits   These can be fresh, frozen, canned, or dried.  Try to choose whole fruit rather than fruit juice.  Eat a variety of colors.        Vegetables   These can be fresh, frozen, canned, or dried.  Beans, peas, and lentils count too.        Whole grains   Choose whole-grain breads, cereals, and noodles.  Try brown rice.        Lean proteins   These can include lean meat, poultry, fish, and eggs.  You can also have tofu, beans, peas, lentils, nuts, and seeds.        Dairy   Try milk, yogurt, and cheese.  Choose low-fat or fat-free when you can.  If you need to, use lactose-free milk or fortified plant-based milk products, such as soy milk.        Water   Drink water when you're thirsty.  Limit sugar-sweetened drinks, including soda, fruit drinks, and sports drinks.  Where can you learn more?  Go to https://www.[employer].net/patientEd and enter T756 to learn more about \"Eating Healthy Foods: Care Instructions.\"  Current as of: October 7, 2024  Content Version: 14.4  © 8926-7377 [employer].   Care instructions adapted under license by [facility]

## 2025-05-15 ENCOUNTER — TELEPHONE (OUTPATIENT)
Dept: FAMILY MEDICINE CLINIC | Age: 70
End: 2025-05-15

## 2025-05-15 DIAGNOSIS — H92.20 BLOOD IN EAR CANAL, UNSPECIFIED LATERALITY: ICD-10-CM

## 2025-05-15 DIAGNOSIS — H92.09 OTALGIA, UNSPECIFIED LATERALITY: Primary | ICD-10-CM

## 2025-05-15 NOTE — TELEPHONE ENCOUNTER
Patient called in regards to left ear. States this morning while cleaning ears noticed blood on Q tip after cleaning left ear. Ears have been plugged for a couple of weeks. Would like referral to ENT. No preference. Please advise.

## 2025-05-20 DIAGNOSIS — Z91.030 BEE STING ALLERGY: Primary | ICD-10-CM

## 2025-05-20 RX ORDER — EPINEPHRINE 0.3 MG/.3ML
0.3 INJECTION SUBCUTANEOUS ONCE
Qty: 0.3 ML | Refills: 0 | Status: SHIPPED | OUTPATIENT
Start: 2025-05-20 | End: 2025-05-20

## 2025-06-26 ENCOUNTER — OFFICE VISIT (OUTPATIENT)
Dept: FAMILY MEDICINE CLINIC | Age: 70
End: 2025-06-26

## 2025-06-26 VITALS
DIASTOLIC BLOOD PRESSURE: 69 MMHG | BODY MASS INDEX: 33 KG/M2 | WEIGHT: 222.8 LBS | HEART RATE: 62 BPM | OXYGEN SATURATION: 98 % | HEIGHT: 69 IN | SYSTOLIC BLOOD PRESSURE: 127 MMHG

## 2025-06-26 DIAGNOSIS — I10 ESSENTIAL HYPERTENSION: Primary | ICD-10-CM

## 2025-06-26 RX ORDER — LISINOPRIL 10 MG/1
10 TABLET ORAL DAILY
Qty: 90 TABLET | Refills: 1 | Status: SHIPPED | OUTPATIENT
Start: 2025-06-26

## 2025-06-26 RX ORDER — MULTIVITAMIN WITH IRON
1 TABLET ORAL DAILY
COMMUNITY

## 2025-06-26 ASSESSMENT — PATIENT HEALTH QUESTIONNAIRE - PHQ9
SUM OF ALL RESPONSES TO PHQ QUESTIONS 1-9: 0
1. LITTLE INTEREST OR PLEASURE IN DOING THINGS: NOT AT ALL
SUM OF ALL RESPONSES TO PHQ QUESTIONS 1-9: 0
2. FEELING DOWN, DEPRESSED OR HOPELESS: NOT AT ALL

## 2025-06-26 NOTE — PROGRESS NOTES
[facility]  [facility] MEDICINE  [address]  [address]  [facility] [address]  Dept: [phone]      Rafita Rincon is a 70 y.o. male who presents today for follow up on his  medical conditions as noted below.      Chief Complaint   Patient presents with    Hypertension       Patient Active Problem List:     Primary osteoarthritis involving multiple joints     Obesity     Chronic pain of both shoulders     Low HDL (under 40)     Benign colon polyp     Diverticulosis large intestine w/o perforation or abscess w/o bleeding     History of seizure     Essential hypertension     LEISA on CPAP     COVID     COVID-19     S/P TKR (total knee replacement) using cement, left     Osteoarthritis     Left knee DJD     Pre-diabetes     Other fatigue     Cold foot with peripheral vascular disease     Past Medical History:   Diagnosis Date    Class 3 severe obesity due to excess calories with body mass index (BMI) greater than or equal to 70 in adult (HCC)     Cold foot with peripheral vascular disease     COVID 11/09/2021    Diverticulosis large intestine w/o perforation or abscess w/bleeding     Hypertension     Obesity     LEISA on CPAP     Osteoarthritis     Seizure (HCC) 1976    While in the service. He was on dilantin appoximately 5 -6 years. No recent seizures    Situational insomnia     Sleep apnea       Past Surgical History:   Procedure Laterality Date    COLONOSCOPY  2009    [facility]    EYE SURGERY      as a child, eye extrusion    JOINT REPLACEMENT Right 2018    knee    JOINT REPLACEMENT Left 2020    knee    KNEE ARTHROSCOPY Left 2010     Family History   Problem Relation Age of Onset    Dementia Mother     Kidney Disease Mother         polycystic kidney    Breast Cancer Mother     Dementia Father     Cancer Brother     Brain Cancer Brother     Lung Cancer Brother     Heart Disease Brother        Current Outpatient Medications   Medication Sig Dispense Refill    Multiple Vitamin

## 2025-07-02 ENCOUNTER — TELEPHONE (OUTPATIENT)
Dept: FAMILY MEDICINE CLINIC | Age: 70
End: 2025-07-02

## 2025-07-02 DIAGNOSIS — Z91.030 BEE STING ALLERGY: Primary | ICD-10-CM

## 2025-07-02 RX ORDER — EPINEPHRINE NASAL SOLUTION 1 MG/ML
1 SOLUTION NASAL ONCE
Qty: 1 ML | Refills: 5 | Status: SHIPPED | OUTPATIENT
Start: 2025-07-02 | End: 2025-07-02